# Patient Record
Sex: MALE | Race: WHITE | Employment: UNEMPLOYED | URBAN - METROPOLITAN AREA
[De-identification: names, ages, dates, MRNs, and addresses within clinical notes are randomized per-mention and may not be internally consistent; named-entity substitution may affect disease eponyms.]

---

## 2017-01-16 ENCOUNTER — OFFICE VISIT (OUTPATIENT)
Dept: SLEEP CENTER | Facility: HOSPITAL | Age: 42
End: 2017-01-16
Attending: INTERNAL MEDICINE
Payer: COMMERCIAL

## 2017-01-16 PROCEDURE — 95810 POLYSOM 6/> YRS 4/> PARAM: CPT

## 2017-01-19 NOTE — PROCEDURES
[address] [address]       Accredited by the [facility] of Sleep Medicine (AASM)    PATIENT'S NAME:        Britney Ng  ATTENDING PHYSICIAN:   Jackie Blackwood M.D. REFERRING PHYSICIAN:   KATE Cardenas There was no significant REM predominance. The oxygen warner was 83%. The patient spent 99% of the record with a saturation of greater than 90%. PERIODIC LIMB MOVEMENTS:  PLM index is 20 with a PLM arousal index of 7.     EEG:  With the limited recordin

## 2017-01-28 ENCOUNTER — HOSPITAL ENCOUNTER (OUTPATIENT)
Dept: ULTRASOUND IMAGING | Age: 42
Discharge: HOME | End: 2017-01-28
Attending: FAMILY MEDICINE
Payer: COMMERCIAL

## 2017-01-28 DIAGNOSIS — E01.0 THYROMEGALY: ICD-10-CM

## 2017-01-28 PROCEDURE — 76536 US EXAM OF HEAD AND NECK: CPT

## 2017-04-29 ENCOUNTER — LAB ENCOUNTER (OUTPATIENT)
Dept: LAB | Age: 42
End: 2017-04-29
Attending: FAMILY MEDICINE
Payer: COMMERCIAL

## 2017-04-29 DIAGNOSIS — E78.5 DYSLIPIDEMIA: ICD-10-CM

## 2017-04-29 DIAGNOSIS — Z00.00 LABORATORY EXAMINATION ORDERED AS PART OF A ROUTINE GENERAL MEDICAL EXAMINATION: ICD-10-CM

## 2017-04-29 PROCEDURE — 85025 COMPLETE CBC W/AUTO DIFF WBC: CPT

## 2017-04-29 PROCEDURE — 80053 COMPREHEN METABOLIC PANEL: CPT

## 2017-04-29 PROCEDURE — 86706 HEP B SURFACE ANTIBODY: CPT

## 2017-04-29 PROCEDURE — 80061 LIPID PANEL: CPT

## 2017-04-29 PROCEDURE — 82306 VITAMIN D 25 HYDROXY: CPT

## 2017-04-29 PROCEDURE — 84443 ASSAY THYROID STIM HORMONE: CPT

## 2017-04-29 PROCEDURE — 86708 HEPATITIS A ANTIBODY: CPT

## 2017-04-29 PROCEDURE — 36415 COLL VENOUS BLD VENIPUNCTURE: CPT

## 2017-05-01 ENCOUNTER — OFFICE VISIT (OUTPATIENT)
Dept: FAMILY MEDICINE CLINIC | Facility: CLINIC | Age: 42
End: 2017-05-01

## 2017-05-01 VITALS
HEIGHT: 70 IN | BODY MASS INDEX: 36.51 KG/M2 | RESPIRATION RATE: 14 BRPM | DIASTOLIC BLOOD PRESSURE: 80 MMHG | SYSTOLIC BLOOD PRESSURE: 120 MMHG | HEART RATE: 88 BPM | WEIGHT: 255 LBS

## 2017-05-01 DIAGNOSIS — E78.5 DYSLIPIDEMIA: ICD-10-CM

## 2017-05-01 DIAGNOSIS — Z13.89 SCREENING FOR GENITOURINARY CONDITION: ICD-10-CM

## 2017-05-01 DIAGNOSIS — Z23 NEED FOR VACCINATION: ICD-10-CM

## 2017-05-01 DIAGNOSIS — Z00.00 ROUTINE GENERAL MEDICAL EXAMINATION AT A HEALTH CARE FACILITY: Primary | ICD-10-CM

## 2017-05-01 PROCEDURE — 90632 HEPA VACCINE ADULT IM: CPT | Performed by: FAMILY MEDICINE

## 2017-05-01 PROCEDURE — 90746 HEPB VACCINE 3 DOSE ADULT IM: CPT | Performed by: FAMILY MEDICINE

## 2017-05-01 PROCEDURE — 99396 PREV VISIT EST AGE 40-64: CPT | Performed by: FAMILY MEDICINE

## 2017-05-01 PROCEDURE — 90715 TDAP VACCINE 7 YRS/> IM: CPT | Performed by: FAMILY MEDICINE

## 2017-05-01 PROCEDURE — 81003 URINALYSIS AUTO W/O SCOPE: CPT | Performed by: FAMILY MEDICINE

## 2017-05-01 PROCEDURE — 90471 IMMUNIZATION ADMIN: CPT | Performed by: FAMILY MEDICINE

## 2017-05-01 PROCEDURE — 90472 IMMUNIZATION ADMIN EACH ADD: CPT | Performed by: FAMILY MEDICINE

## 2017-05-01 RX ORDER — DOXEPIN HYDROCHLORIDE 50 MG/1
1 CAPSULE ORAL DAILY
COMMUNITY
End: 2018-03-09

## 2017-05-01 RX ORDER — CHLORAL HYDRATE 500 MG
2400 CAPSULE ORAL DAILY
COMMUNITY
End: 2018-03-09
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
## 2017-06-02 ENCOUNTER — OFFICE VISIT (OUTPATIENT)
Dept: FAMILY MEDICINE CLINIC | Facility: CLINIC | Age: 42
End: 2017-06-02

## 2017-06-02 VITALS
RESPIRATION RATE: 20 BRPM | SYSTOLIC BLOOD PRESSURE: 124 MMHG | DIASTOLIC BLOOD PRESSURE: 82 MMHG | BODY MASS INDEX: 35.91 KG/M2 | TEMPERATURE: 97 F | WEIGHT: 248 LBS | HEIGHT: 69.7 IN | HEART RATE: 64 BPM

## 2017-06-02 DIAGNOSIS — E78.00 HYPERCHOLESTEROLEMIA: ICD-10-CM

## 2017-06-02 DIAGNOSIS — E55.9 VITAMIN D DEFICIENCY: Primary | ICD-10-CM

## 2017-06-02 DIAGNOSIS — Z23 HEPATITIS B VACCINATION ADMINISTERED AT CURRENT VISIT: ICD-10-CM

## 2017-06-02 PROCEDURE — 90471 IMMUNIZATION ADMIN: CPT | Performed by: FAMILY MEDICINE

## 2017-06-02 PROCEDURE — 99213 OFFICE O/P EST LOW 20 MIN: CPT | Performed by: FAMILY MEDICINE

## 2017-06-02 PROCEDURE — 90746 HEPB VACCINE 3 DOSE ADULT IM: CPT | Performed by: FAMILY MEDICINE

## 2017-06-02 NOTE — PROGRESS NOTES
[facility] [place]  PROGRESS NOTE  Chief Complaint:   Patient presents with:  Establish Care      HPI:   This is a 42year old male presents to establish care at clinic. Patient had hepatitis B vaccine his first dose about a month ago.   He is h Hypertension Mother    • Cancer Paternal Grandmother    • Heart Disorder Paternal Grandfather      Allergies:  No Known Allergies  Current Meds:    Current Outpatient Prescriptions:  multivitamin Oral Tab Take 1 tablet by mouth daily.  Disp:  Rfl:    rex dorsalis pedis pulses bilaterally. ABDOMEN:  Soft, nondistended, nontender, bowel sounds normal in all 4 quadrants, no masses, no hepatosplenomegaly. SKIN: No rashes, no skin lesion, no bruising, good turgor.   LYMPHATIC: No cervical lymphadenopathy, no o

## 2017-06-02 NOTE — PATIENT INSTRUCTIONS
Hep B 2nd vaccine given today. Next in 5 months. Advice low cholesterol diet and exercise. Continue using fish oil supplement. Recheck cholesterol  in 5 months when returning for 3rd Hep B vaccine.

## 2017-11-03 ENCOUNTER — TELEPHONE (OUTPATIENT)
Dept: FAMILY MEDICINE CLINIC | Facility: CLINIC | Age: 42
End: 2017-11-03

## 2017-11-03 NOTE — TELEPHONE ENCOUNTER
I spoke to [facility] about him missing his lab and injection appointment today. ..he said that he thought he put it in his phone calendar and was so sorry he missed it and will call back to reschedule

## 2017-12-07 ENCOUNTER — OFFICE VISIT (OUTPATIENT)
Dept: FAMILY MEDICINE CLINIC | Facility: CLINIC | Age: 42
End: 2017-12-07

## 2017-12-07 VITALS
BODY MASS INDEX: 34.39 KG/M2 | HEIGHT: 70 IN | DIASTOLIC BLOOD PRESSURE: 88 MMHG | SYSTOLIC BLOOD PRESSURE: 130 MMHG | WEIGHT: 240.19 LBS | RESPIRATION RATE: 18 BRPM | HEART RATE: 92 BPM | TEMPERATURE: 98 F

## 2017-12-07 DIAGNOSIS — Z23 IMMUNIZATION DUE: ICD-10-CM

## 2017-12-07 DIAGNOSIS — M19.90 ARTHRITIS: Primary | ICD-10-CM

## 2017-12-07 PROCEDURE — 99214 OFFICE O/P EST MOD 30 MIN: CPT | Performed by: FAMILY MEDICINE

## 2017-12-07 PROCEDURE — 90471 IMMUNIZATION ADMIN: CPT | Performed by: FAMILY MEDICINE

## 2017-12-07 PROCEDURE — 90746 HEPB VACCINE 3 DOSE ADULT IM: CPT | Performed by: FAMILY MEDICINE

## 2017-12-07 PROCEDURE — 86038 ANTINUCLEAR ANTIBODIES: CPT | Performed by: FAMILY MEDICINE

## 2017-12-07 PROCEDURE — 84550 ASSAY OF BLOOD/URIC ACID: CPT | Performed by: FAMILY MEDICINE

## 2017-12-07 PROCEDURE — 90632 HEPA VACCINE ADULT IM: CPT | Performed by: FAMILY MEDICINE

## 2017-12-07 PROCEDURE — 85652 RBC SED RATE AUTOMATED: CPT | Performed by: FAMILY MEDICINE

## 2017-12-07 PROCEDURE — 86140 C-REACTIVE PROTEIN: CPT | Performed by: FAMILY MEDICINE

## 2017-12-07 PROCEDURE — 90472 IMMUNIZATION ADMIN EACH ADD: CPT | Performed by: FAMILY MEDICINE

## 2017-12-07 PROCEDURE — 86200 CCP ANTIBODY: CPT | Performed by: FAMILY MEDICINE

## 2017-12-07 PROCEDURE — 86431 RHEUMATOID FACTOR QUANT: CPT | Performed by: FAMILY MEDICINE

## 2017-12-07 NOTE — PROGRESS NOTES
[facility] [place]  PROGRESS NOTE  Chief Complaint:   Patient presents with:  Imm/Inj: needs injection, missed appt last month  Arthritis: wants to discuss arthritis in hands      HPI:   This is a 36year old male presents for evaluation of arth 1.8 oz/week     Cans of beer: 3 per week     Comment: occasionally/ socially     Social History Narrative    ** Merged History Encounter **           Family History:  Family History   Problem Relation Age of Onset   • Cancer Father      colon   • Diabetes Sitting, Cuff Size: large)   Pulse 92   Temp 98.3 °F (36.8 °C) (Temporal)   Resp 18   Ht 70\"   Wt 240 lb 3.2 oz   BMI 34.47 kg/m²  Estimated body mass index is 34.47 kg/m² as calculated from the following:    Height as of this encounter: 70\".     Weight a drinking Red Bull. Schedule appointment for ADD evaluation. Health Maintenance:   Tobacco Cessation Counseling 2 Years due on 09/26/1986  Pneumococcal PPSV23 Medium Risk Adult(1 of 1 - PPSV23) due on 09/26/1993  Influenza Vaccine(1) due on 09/01/201

## 2017-12-07 NOTE — PATIENT INSTRUCTIONS
Check arthritis labs today. Immunizations given. Schedule appointment with Dr Елена Hou. Recommend to stop drinking Red Bull. Schedule appointment for ADD evaluation.

## 2017-12-11 ENCOUNTER — TELEPHONE (OUTPATIENT)
Dept: FAMILY MEDICINE CLINIC | Facility: CLINIC | Age: 42
End: 2017-12-11

## 2017-12-11 NOTE — TELEPHONE ENCOUNTER
----- Message from Gonzalo Newton MD sent at 12/11/2017 11:08 AM CST -----  Please inform patient that his arthritis panel is negative and in normal range. Recommend to also fax result to Dr Jillian Francois. Recommend patient to follow-up with him.

## 2018-01-04 ENCOUNTER — OFFICE VISIT (OUTPATIENT)
Dept: FAMILY MEDICINE CLINIC | Facility: CLINIC | Age: 43
End: 2018-01-04

## 2018-01-04 VITALS
SYSTOLIC BLOOD PRESSURE: 112 MMHG | WEIGHT: 237.38 LBS | OXYGEN SATURATION: 98 % | HEIGHT: 69.5 IN | HEART RATE: 86 BPM | DIASTOLIC BLOOD PRESSURE: 60 MMHG | TEMPERATURE: 99 F | BODY MASS INDEX: 34.37 KG/M2 | RESPIRATION RATE: 18 BRPM

## 2018-01-04 DIAGNOSIS — H66.001 ACUTE SUPPURATIVE OTITIS MEDIA OF RIGHT EAR WITHOUT SPONTANEOUS RUPTURE OF TYMPANIC MEMBRANE, RECURRENCE NOT SPECIFIED: ICD-10-CM

## 2018-01-04 DIAGNOSIS — J01.00 ACUTE MAXILLARY SINUSITIS, RECURRENCE NOT SPECIFIED: Primary | ICD-10-CM

## 2018-01-04 PROCEDURE — 99214 OFFICE O/P EST MOD 30 MIN: CPT | Performed by: FAMILY MEDICINE

## 2018-01-04 RX ORDER — AMOXICILLIN 500 MG/1
500 CAPSULE ORAL 3 TIMES DAILY
Qty: 30 CAPSULE | Refills: 0 | Status: SHIPPED | OUTPATIENT
Start: 2018-01-04 | End: 2018-01-14

## 2018-01-04 RX ORDER — FLUTICASONE PROPIONATE 50 MCG
1 SPRAY, SUSPENSION (ML) NASAL DAILY
Qty: 1 BOTTLE | Refills: 0 | Status: SHIPPED | OUTPATIENT
Start: 2018-01-04 | End: 2018-09-14

## 2018-01-04 NOTE — PROGRESS NOTES
[address] NOTE  Chief Complaint:   Patient presents with:  Fever: low grade fever for 5 days  Ear Pain: ear pain in right ear, clogged, nasal congestion for 2 weeks.        HPI:   This is a 36year old male presents presents co History Encounter **           Family History:  Family History   Problem Relation Age of Onset   • Cancer Father      colon   • Diabetes Father    • Heart Disorder Father      CABG, stents   • Hypertension Father    • Heart Disorder Mother      a fib   • H Estimated body mass index is 34.56 kg/m² as calculated from the following:    Height as of this encounter: 69.5\". Weight as of this encounter: 237 lb 6.4 oz. Vital signs reviewed.     GEN:  Patient is alert, awake and oriented, well developed, well no topical vicks, nasal saline spray as needed. Start amoxicillin and flonase. May continue with mucinex. May use ibuprofen as needed   Return to clinic in 1-2 weeks if no improvement. Sooner if symptoms gets worse. Health Maintenance:   Tobacco

## 2018-01-04 NOTE — PATIENT INSTRUCTIONS
Adequate rest and hydration, warm facial packs, and steam inhalation. May use topical vicks, nasal saline spray as needed. Start amoxicillin and flonase. May continue with mucinex.    May use ibuprofen as needed   Return to clinic in 1-2 weeks if no i

## 2018-02-21 ENCOUNTER — TELEPHONE (OUTPATIENT)
Dept: FAMILY MEDICINE CLINIC | Facility: CLINIC | Age: 43
End: 2018-02-21

## 2018-02-21 DIAGNOSIS — M19.90 ARTHRITIS: Primary | ICD-10-CM

## 2018-02-21 NOTE — TELEPHONE ENCOUNTER
Wife states that he was referred to Dr Teto Lujan in December but our office stated that it was in his insurance network.  Wife states that she spoke with insurance and they said that the patient could go to Dr Teto Lujan as long as he had a referral from the D

## 2018-02-27 ENCOUNTER — TELEPHONE (OUTPATIENT)
Dept: FAMILY MEDICINE CLINIC | Facility: CLINIC | Age: 43
End: 2018-02-27

## 2018-02-27 DIAGNOSIS — M19.90 ARTHRITIS: Primary | ICD-10-CM

## 2018-02-27 NOTE — TELEPHONE ENCOUNTER
needs new referral and OV notes etc  faxed to [address] [place] [address]    @ [facility]    fax# [phone]      OTC til Wed 2/28

## 2018-02-28 NOTE — TELEPHONE ENCOUNTER
Can you please place orders for Dr Giovanni Cohen.  is part of the [employer] system. Can you please advise. Thank you.

## 2018-03-09 ENCOUNTER — OFFICE VISIT (OUTPATIENT)
Dept: RHEUMATOLOGY | Facility: CLINIC | Age: 43
End: 2018-03-09

## 2018-03-09 VITALS
BODY MASS INDEX: 34 KG/M2 | HEART RATE: 72 BPM | RESPIRATION RATE: 16 BRPM | SYSTOLIC BLOOD PRESSURE: 118 MMHG | WEIGHT: 235 LBS | DIASTOLIC BLOOD PRESSURE: 82 MMHG

## 2018-03-09 DIAGNOSIS — M79.642 PAIN IN BOTH HANDS: Primary | ICD-10-CM

## 2018-03-09 DIAGNOSIS — M79.7 FIBROMYALGIA: ICD-10-CM

## 2018-03-09 DIAGNOSIS — S09.90XS TRAUMATIC INJURY OF HEAD, SEQUELA: ICD-10-CM

## 2018-03-09 DIAGNOSIS — M79.641 PAIN IN BOTH HANDS: Primary | ICD-10-CM

## 2018-03-09 DIAGNOSIS — M15.1 HEBERDEN NODE: ICD-10-CM

## 2018-03-09 PROBLEM — S09.90XA HEAD INJURY DUE TO TRAUMA: Status: ACTIVE | Noted: 2018-03-09

## 2018-03-09 PROBLEM — M19.90 ARTHRITIS: Status: RESOLVED | Noted: 2018-02-21 | Resolved: 2018-03-09

## 2018-03-09 PROCEDURE — 99244 OFF/OP CNSLTJ NEW/EST MOD 40: CPT | Performed by: INTERNAL MEDICINE

## 2018-03-09 RX ORDER — DULOXETIN HYDROCHLORIDE 60 MG/1
60 CAPSULE, DELAYED RELEASE ORAL DAILY
Qty: 30 CAPSULE | Refills: 3 | Status: SHIPPED | OUTPATIENT
Start: 2018-03-09 | End: 2018-07-03

## 2018-03-09 RX ORDER — ETODOLAC 400 MG/1
400 TABLET, FILM COATED ORAL 2 TIMES DAILY
Qty: 60 TABLET | Refills: 3 | Status: SHIPPED | OUTPATIENT
Start: 2018-03-09 | End: 2018-04-17
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
## 2018-04-12 ENCOUNTER — HOSPITAL ENCOUNTER (OUTPATIENT)
Dept: GENERAL RADIOLOGY | Age: 43
Discharge: HOME | End: 2018-04-12
Attending: INTERNAL MEDICINE
Payer: COMMERCIAL

## 2018-04-12 DIAGNOSIS — M79.642 PAIN IN BOTH HANDS: ICD-10-CM

## 2018-04-12 DIAGNOSIS — M79.641 PAIN IN BOTH HANDS: ICD-10-CM

## 2018-04-12 PROCEDURE — 73130 X-RAY EXAM OF HAND: CPT | Performed by: INTERNAL MEDICINE

## 2018-04-17 ENCOUNTER — OFFICE VISIT (OUTPATIENT)
Dept: RHEUMATOLOGY | Facility: CLINIC | Age: 43
End: 2018-04-17

## 2018-04-17 VITALS
WEIGHT: 232 LBS | RESPIRATION RATE: 20 BRPM | SYSTOLIC BLOOD PRESSURE: 110 MMHG | HEART RATE: 76 BPM | HEIGHT: 69 IN | BODY MASS INDEX: 34.36 KG/M2 | DIASTOLIC BLOOD PRESSURE: 70 MMHG

## 2018-04-17 DIAGNOSIS — S09.90XS TRAUMATIC INJURY OF HEAD, SEQUELA: ICD-10-CM

## 2018-04-17 DIAGNOSIS — M79.641 PAIN IN BOTH HANDS: ICD-10-CM

## 2018-04-17 DIAGNOSIS — M79.642 PAIN IN BOTH HANDS: ICD-10-CM

## 2018-04-17 DIAGNOSIS — M79.7 FIBROMYALGIA: Primary | ICD-10-CM

## 2018-04-17 DIAGNOSIS — M18.0 OSTEOARTHRITIS OF CARPOMETACARPAL (CMC) JOINT OF BOTH THUMBS: ICD-10-CM

## 2018-04-17 DIAGNOSIS — M15.1 HEBERDEN NODE: ICD-10-CM

## 2018-04-17 PROCEDURE — 99214 OFFICE O/P EST MOD 30 MIN: CPT | Performed by: INTERNAL MEDICINE

## 2018-04-17 RX ORDER — RUFINAMIDE 40 MG/ML
1 SUSPENSION ORAL DAILY
COMMUNITY
End: 2020-09-03

## 2018-04-17 NOTE — PROGRESS NOTES
[facility]  Dr. González Gan Progress Note     Subjective:   Jaqui Gonzalez is a(n) 36year old male. Current complaints: Patient presents with:  Fibromyalgia Syndrome: 6 week f/u.  Pt on Cymbalta x 6 weeks pt stated works well for [employer] and therapy. Exercise as best you can, consider yoga, edgar chi, strectching. Well balanced diet. Return to office 3 months.       Cele Mccray MD 5/70/4442 11:19 AM

## 2018-04-17 NOTE — PATIENT INSTRUCTIONS
Continue Cymbalta 60 mg daily for Fibromyalgia. Talk to primary about referral to hand specialist.  For pain consider use of medical marijuana as an option instead of Vicodin/Norco.  Afraid NSAIDs due to gi intolerance. Consider water exercise therapy.

## 2018-07-03 RX ORDER — DULOXETIN HYDROCHLORIDE 60 MG/1
CAPSULE, DELAYED RELEASE ORAL
Qty: 30 CAPSULE | Refills: 0 | Status: SHIPPED | OUTPATIENT
Start: 2018-07-03 | End: 2018-07-23

## 2018-07-03 NOTE — TELEPHONE ENCOUNTER
Future Appointments  Date Time Provider Gregor Burgos   9/79/2466 8:30 PM Kimberlee Lucas MD [facility] Charissa Hoover

## 2018-07-23 ENCOUNTER — OFFICE VISIT (OUTPATIENT)
Dept: RHEUMATOLOGY | Facility: CLINIC | Age: 43
End: 2018-07-23

## 2018-07-23 VITALS
RESPIRATION RATE: 12 BRPM | HEART RATE: 72 BPM | SYSTOLIC BLOOD PRESSURE: 116 MMHG | BODY MASS INDEX: 34 KG/M2 | DIASTOLIC BLOOD PRESSURE: 78 MMHG | WEIGHT: 231 LBS

## 2018-07-23 DIAGNOSIS — M18.0 OSTEOARTHRITIS OF CARPOMETACARPAL (CMC) JOINT OF BOTH THUMBS: ICD-10-CM

## 2018-07-23 DIAGNOSIS — M79.642 PAIN IN BOTH HANDS: ICD-10-CM

## 2018-07-23 DIAGNOSIS — M79.641 PAIN IN BOTH HANDS: ICD-10-CM

## 2018-07-23 DIAGNOSIS — M79.7 FIBROMYALGIA: Primary | ICD-10-CM

## 2018-07-23 PROCEDURE — 99213 OFFICE O/P EST LOW 20 MIN: CPT | Performed by: INTERNAL MEDICINE

## 2018-07-23 RX ORDER — DULOXETIN HYDROCHLORIDE 60 MG/1
60 CAPSULE, DELAYED RELEASE ORAL 2 TIMES DAILY
Qty: 60 CAPSULE | Refills: 3 | Status: SHIPPED | OUTPATIENT
Start: 2018-07-23 | End: 2018-07-23

## 2018-07-23 RX ORDER — DULOXETIN HYDROCHLORIDE 60 MG/1
60 CAPSULE, DELAYED RELEASE ORAL 2 TIMES DAILY
Qty: 180 CAPSULE | Refills: 1 | Status: SHIPPED | OUTPATIENT
Start: 2018-07-23 | End: 2019-01-31

## 2018-07-23 NOTE — PROGRESS NOTES
[facility]  Dr. Maxine Garcia Progress Note     Subjective:   Breana Baeza is a(n) 36year old male. Current complaints: Patient presents with:  Fibromyalgia Syndrome: 3 month f/u.  Pt has increased cymbalta 60 mg to twice a day about 5 days ago- f

## 2018-07-23 NOTE — PATIENT INSTRUCTIONS
Use cymbalta 60 mg twice a day for fibromyalgia. No NSAIDs due to stomach upset. Ok to apply for medical marijuana for pain control. For hand pain see Dr Sai Silva or Dr Christiano Smallwood. [address] [facility] hand specialists.   Alternate Hand specialist

## 2018-07-31 RX ORDER — DULOXETIN HYDROCHLORIDE 60 MG/1
CAPSULE, DELAYED RELEASE ORAL
Qty: 30 CAPSULE | Refills: 0 | OUTPATIENT
Start: 2018-07-31

## 2018-08-31 ENCOUNTER — TELEPHONE (OUTPATIENT)
Dept: FAMILY MEDICINE CLINIC | Facility: CLINIC | Age: 43
End: 2018-08-31

## 2018-09-06 NOTE — TELEPHONE ENCOUNTER
Set up appt. Future Appointments  Date Time Provider Gregor Burgos   9/13/2018 4:20 PM Tex Garay MD [facility] [place] [facility]   62/67/1052 4:29 PM Tani Wilson MD [facility]     No other questions at this time.

## 2018-09-14 ENCOUNTER — OFFICE VISIT (OUTPATIENT)
Dept: FAMILY MEDICINE CLINIC | Facility: CLINIC | Age: 43
End: 2018-09-14

## 2018-09-14 VITALS
OXYGEN SATURATION: 99 % | HEART RATE: 77 BPM | BODY MASS INDEX: 34.33 KG/M2 | DIASTOLIC BLOOD PRESSURE: 78 MMHG | RESPIRATION RATE: 20 BRPM | HEIGHT: 69 IN | SYSTOLIC BLOOD PRESSURE: 110 MMHG | WEIGHT: 231.81 LBS | TEMPERATURE: 98 F

## 2018-09-14 DIAGNOSIS — G47.33 OSA (OBSTRUCTIVE SLEEP APNEA): ICD-10-CM

## 2018-09-14 DIAGNOSIS — M79.641 PAIN IN BOTH HANDS: Primary | ICD-10-CM

## 2018-09-14 DIAGNOSIS — M79.642 PAIN IN BOTH HANDS: Primary | ICD-10-CM

## 2018-09-14 PROCEDURE — 99213 OFFICE O/P EST LOW 20 MIN: CPT | Performed by: FAMILY MEDICINE

## 2018-09-14 NOTE — PATIENT INSTRUCTIONS
Recommend aleve or ibuprofen as needed   See orthopedics. See Dr Pankaj Paredes for sleep apnea. Return to clinic if any concern.

## 2018-09-14 NOTE — PROGRESS NOTES
[facility] [place]  PROGRESS NOTE  Chief Complaint:   Patient presents with:  Referral: would like see a hand specialist and sleep apnea specialist       HPI:   This is a 36year old male presents for evaluation of both hand pain and sleep apnea Rfl: 1   Multivitamin Chewtab, ADULT, Oral Chew Tab Chew 1 tablet by mouth daily. Disp:  Rfl:    Aspirin-Acetaminophen-Caffeine (EXCEDRIN OR) Take by mouth as needed.  Disp:  Rfl:       Ready to quit: Not Answered  Counseling given: Not Answered         REV normal in all 4 quadrants, no Masses, no hepatosplenomegaly. SKIN: No rashes, no skin lesion, no bruising, good turgor. LYMPHATIC: No cervical lymphadenopathy, no other lymphadenopathy.   MUSCULOSKELETAL: Mild tenderness with palpation over distal digits

## 2018-10-09 PROBLEM — M18.0 ARTHRITIS OF CARPOMETACARPAL (CMC) JOINT OF BOTH THUMBS: Status: ACTIVE | Noted: 2018-10-09

## 2018-10-09 PROBLEM — M19.041 ARTHRITIS OF FINGER OF BOTH HANDS: Status: ACTIVE | Noted: 2018-10-09

## 2018-10-09 PROBLEM — M19.042 ARTHRITIS OF FINGER OF BOTH HANDS: Status: ACTIVE | Noted: 2018-10-09

## 2019-01-31 ENCOUNTER — OFFICE VISIT (OUTPATIENT)
Dept: RHEUMATOLOGY | Facility: CLINIC | Age: 44
End: 2019-01-31

## 2019-01-31 VITALS
SYSTOLIC BLOOD PRESSURE: 124 MMHG | DIASTOLIC BLOOD PRESSURE: 62 MMHG | BODY MASS INDEX: 34.07 KG/M2 | HEIGHT: 69 IN | RESPIRATION RATE: 16 BRPM | WEIGHT: 230 LBS | HEART RATE: 78 BPM

## 2019-01-31 DIAGNOSIS — M18.0 OSTEOARTHRITIS OF CARPOMETACARPAL (CMC) JOINT OF BOTH THUMBS: ICD-10-CM

## 2019-01-31 DIAGNOSIS — M79.7 FIBROMYALGIA: Primary | ICD-10-CM

## 2019-01-31 PROCEDURE — 99213 OFFICE O/P EST LOW 20 MIN: CPT | Performed by: INTERNAL MEDICINE

## 2019-01-31 RX ORDER — DULOXETIN HYDROCHLORIDE 60 MG/1
60 CAPSULE, DELAYED RELEASE ORAL 2 TIMES DAILY
Qty: 180 CAPSULE | Refills: 1 | Status: SHIPPED | OUTPATIENT
Start: 2019-01-31 | End: 2019-07-30

## 2019-01-31 NOTE — PROGRESS NOTES
[facility]  Dr. Gayatri Urias Progress Note     Subjective:   Yue Tovar is a(n) 39year old male. Current complaints: Patient presents with:  Fibromyalgia Syndrome: LOV 7/23/18. Pt continues with Cymbalta, and started medical marijuiana.   Osteo

## 2019-01-31 NOTE — PATIENT INSTRUCTIONS
Use cymbalta 60 mg twice a day. Use medical marijuana as needed. Exercise regularly. Return to office 6 months.

## 2019-02-22 RX ORDER — DULOXETIN HYDROCHLORIDE 60 MG/1
CAPSULE, DELAYED RELEASE ORAL
Qty: 180 CAPSULE | Refills: 0 | OUTPATIENT
Start: 2019-02-22

## 2019-07-30 RX ORDER — DULOXETIN HYDROCHLORIDE 60 MG/1
CAPSULE, DELAYED RELEASE ORAL
Qty: 180 CAPSULE | Refills: 0 | OUTPATIENT
Start: 2019-07-30

## 2019-07-30 RX ORDER — DULOXETIN HYDROCHLORIDE 60 MG/1
CAPSULE, DELAYED RELEASE ORAL
Qty: 60 CAPSULE | Refills: 0 | Status: SHIPPED | OUTPATIENT
Start: 2019-07-30 | End: 2019-08-05 | Stop reason: DRUGHIGH

## 2019-08-01 ENCOUNTER — TELEPHONE (OUTPATIENT)
Dept: FAMILY MEDICINE CLINIC | Facility: CLINIC | Age: 44
End: 2019-08-01

## 2019-08-01 DIAGNOSIS — M19.90 ARTHRITIS: Primary | ICD-10-CM

## 2019-08-01 NOTE — TELEPHONE ENCOUNTER
Patient's wife states that they have been seeing Dr Nidhi Rincon for about a year now but patient has an appt coming up and the office stated they needed an updated referral in their system.      Dr Claudia Lee can you please advise on referral for Dr Nidhi Rincon in [place]

## 2019-08-01 NOTE — TELEPHONE ENCOUNTER
Let pt's wife know the following below. Pt's wife verbalized her understanding and had no other questions at this time. Wife will talk with the patient and call the office back to set up appt.

## 2019-08-05 ENCOUNTER — OFFICE VISIT (OUTPATIENT)
Dept: RHEUMATOLOGY | Facility: CLINIC | Age: 44
End: 2019-08-05

## 2019-08-05 VITALS
SYSTOLIC BLOOD PRESSURE: 120 MMHG | HEIGHT: 69 IN | WEIGHT: 230 LBS | DIASTOLIC BLOOD PRESSURE: 80 MMHG | BODY MASS INDEX: 34.07 KG/M2 | HEART RATE: 88 BPM

## 2019-08-05 DIAGNOSIS — M19.042 ARTHRITIS OF FINGER OF BOTH HANDS: Primary | ICD-10-CM

## 2019-08-05 DIAGNOSIS — M19.041 ARTHRITIS OF FINGER OF BOTH HANDS: Primary | ICD-10-CM

## 2019-08-05 DIAGNOSIS — M79.7 FIBROMYALGIA: ICD-10-CM

## 2019-08-05 DIAGNOSIS — M18.0 OSTEOARTHRITIS OF CARPOMETACARPAL (CMC) JOINT OF BOTH THUMBS: ICD-10-CM

## 2019-08-05 DIAGNOSIS — G56.02 CARPAL TUNNEL SYNDROME OF LEFT WRIST: ICD-10-CM

## 2019-08-05 PROBLEM — F32.A DEPRESSION: Status: ACTIVE | Noted: 2019-08-05

## 2019-08-05 PROCEDURE — 99213 OFFICE O/P EST LOW 20 MIN: CPT | Performed by: INTERNAL MEDICINE

## 2019-08-05 RX ORDER — FLUOXETINE HYDROCHLORIDE 20 MG/1
20 CAPSULE ORAL DAILY
Qty: 90 CAPSULE | Refills: 3 | Status: SHIPPED | OUTPATIENT
Start: 2019-08-05 | End: 2020-02-21

## 2019-08-05 RX ORDER — DULOXETIN HYDROCHLORIDE 30 MG/1
30 CAPSULE, DELAYED RELEASE ORAL DAILY
Qty: 90 CAPSULE | Refills: 1 | Status: SHIPPED | OUTPATIENT
Start: 2019-08-05 | End: 2019-09-24

## 2019-08-05 NOTE — PATIENT INSTRUCTIONS
Current plan - introduce prozac/fluoxetine 20 mg per day in the morning with duloxetine 60 mg in the am.  Cut back on the evening cymbalta/duloxetine to 30 mg per day with the introduction of prozac. After 2 weeks drop off the cymbalta evening dose.   See

## 2019-08-05 NOTE — PROGRESS NOTES
[facility]  Dr. Ti Ashby Progress Note     Subjective:   eTe Virgen is a(n) 39year old male.    Current complaints: Patient presents with:  Fibromyalgia Syndrome: f/u, while on vacation ran out of medical marijuana and then has problems toelr

## 2019-09-24 ENCOUNTER — OFFICE VISIT (OUTPATIENT)
Dept: RHEUMATOLOGY | Facility: CLINIC | Age: 44
End: 2019-09-24

## 2019-09-24 VITALS
SYSTOLIC BLOOD PRESSURE: 120 MMHG | BODY MASS INDEX: 34.07 KG/M2 | RESPIRATION RATE: 18 BRPM | HEART RATE: 80 BPM | HEIGHT: 69 IN | WEIGHT: 230 LBS | DIASTOLIC BLOOD PRESSURE: 80 MMHG

## 2019-09-24 DIAGNOSIS — M18.0 OSTEOARTHRITIS OF CARPOMETACARPAL (CMC) JOINT OF BOTH THUMBS: ICD-10-CM

## 2019-09-24 DIAGNOSIS — M79.7 FIBROMYALGIA: Primary | ICD-10-CM

## 2019-09-24 PROCEDURE — 99213 OFFICE O/P EST LOW 20 MIN: CPT | Performed by: INTERNAL MEDICINE

## 2019-09-24 RX ORDER — DULOXETIN HYDROCHLORIDE 60 MG/1
60 CAPSULE, DELAYED RELEASE ORAL 2 TIMES DAILY
COMMUNITY
End: 2019-09-24

## 2019-09-24 RX ORDER — DULOXETIN HYDROCHLORIDE 60 MG/1
60 CAPSULE, DELAYED RELEASE ORAL 2 TIMES DAILY
Qty: 180 CAPSULE | Refills: 1 | Status: SHIPPED | OUTPATIENT
Start: 2019-09-24 | End: 2020-03-27

## 2019-09-24 NOTE — PROGRESS NOTES
[facility]  Dr. Eusebia Stiles Progress Note     Subjective:   Bessy Menjivar is a(n) 39year old male. Current complaints: Patient presents with:  Fibromyalgia Syndrome: Now on Cymbalta 120 mg bid and Prozac 20 mg in evening, doing ok.   Pain is doin

## 2019-09-24 NOTE — PATIENT INSTRUCTIONS
For fibromyalgia use duloxetine 60 mg twice a day. Use prozac 20 mg at night. Use medical marijuana as needed. Exercise as tolerated. Return to office 6 months.

## 2020-01-03 ENCOUNTER — TELEPHONE (OUTPATIENT)
Dept: RHEUMATOLOGY | Facility: CLINIC | Age: 45
End: 2020-01-03

## 2020-02-21 ENCOUNTER — TELEPHONE (OUTPATIENT)
Dept: RHEUMATOLOGY | Facility: CLINIC | Age: 45
End: 2020-02-21

## 2020-02-21 RX ORDER — FLUOXETINE HYDROCHLORIDE 20 MG/1
20 CAPSULE ORAL DAILY
Qty: 90 CAPSULE | Refills: 0 | Status: SHIPPED | OUTPATIENT
Start: 2020-02-21 | End: 2020-03-16

## 2020-03-16 ENCOUNTER — TELEPHONE (OUTPATIENT)
Dept: RHEUMATOLOGY | Facility: CLINIC | Age: 45
End: 2020-03-16

## 2020-03-16 RX ORDER — FLUOXETINE HYDROCHLORIDE 20 MG/1
20 CAPSULE ORAL 2 TIMES DAILY
Qty: 180 CAPSULE | Refills: 1 | Status: SHIPPED | OUTPATIENT
Start: 2020-03-16 | End: 2020-09-11

## 2020-03-16 NOTE — TELEPHONE ENCOUNTER
Patient called. Discussed the coronavirus situation. Follow CDC guidelines. Limited activity. Avoid crowds. Wash hands frequently. Refill for Prozac 20 mg twice a day given #180. See in office in 3 months. Dr. Ronda Caceres.

## 2020-03-27 RX ORDER — DULOXETIN HYDROCHLORIDE 60 MG/1
CAPSULE, DELAYED RELEASE ORAL
Qty: 180 CAPSULE | Refills: 1 | Status: SHIPPED | OUTPATIENT
Start: 2020-03-27 | End: 2020-06-25

## 2020-06-26 RX ORDER — DULOXETIN HYDROCHLORIDE 60 MG/1
CAPSULE, DELAYED RELEASE ORAL
Qty: 180 CAPSULE | Refills: 0 | Status: SHIPPED | OUTPATIENT
Start: 2020-06-26 | End: 2020-08-12

## 2020-07-16 ENCOUNTER — TELEPHONE (OUTPATIENT)
Dept: FAMILY MEDICINE CLINIC | Facility: CLINIC | Age: 45
End: 2020-07-16

## 2020-08-12 ENCOUNTER — OFFICE VISIT (OUTPATIENT)
Dept: FAMILY MEDICINE CLINIC | Facility: CLINIC | Age: 45
End: 2020-08-12

## 2020-08-12 VITALS
WEIGHT: 205 LBS | OXYGEN SATURATION: 97 % | HEIGHT: 69 IN | BODY MASS INDEX: 30.36 KG/M2 | SYSTOLIC BLOOD PRESSURE: 122 MMHG | RESPIRATION RATE: 18 BRPM | TEMPERATURE: 98 F | HEART RATE: 88 BPM | DIASTOLIC BLOOD PRESSURE: 60 MMHG

## 2020-08-12 DIAGNOSIS — G47.39 OTHER SLEEP APNEA: ICD-10-CM

## 2020-08-12 DIAGNOSIS — G89.29 CHRONIC PAIN OF RIGHT KNEE: Primary | ICD-10-CM

## 2020-08-12 DIAGNOSIS — F43.10 PTSD (POST-TRAUMATIC STRESS DISORDER): ICD-10-CM

## 2020-08-12 DIAGNOSIS — M25.561 CHRONIC PAIN OF RIGHT KNEE: Primary | ICD-10-CM

## 2020-08-12 PROCEDURE — 99213 OFFICE O/P EST LOW 20 MIN: CPT | Performed by: FAMILY MEDICINE

## 2020-08-12 PROCEDURE — 3078F DIAST BP <80 MM HG: CPT | Performed by: FAMILY MEDICINE

## 2020-08-12 PROCEDURE — 3074F SYST BP LT 130 MM HG: CPT | Performed by: FAMILY MEDICINE

## 2020-08-12 PROCEDURE — 3008F BODY MASS INDEX DOCD: CPT | Performed by: FAMILY MEDICINE

## 2020-08-12 RX ORDER — DULOXETIN HYDROCHLORIDE 60 MG/1
60 CAPSULE, DELAYED RELEASE ORAL EVERY MORNING
COMMUNITY
Start: 2020-09-15 | End: 2020-10-01

## 2020-08-12 NOTE — PATIENT INSTRUCTIONS
Continue current medications  See orthopedics and psychiatrist.   Rest, ice pack, tylenol as needed. Return to clinic if any concern.

## 2020-08-12 NOTE — PROGRESS NOTES
[address]  PROGRESS NOTE  Chief Complaint:   Patient presents with:  Referral: for ortho and psych.       HPI:   This is a 38year old male with chronic knee pain and posttraumatic stress disorder presents for referral.  Patient has  Problem Relation Age of Onset   • Cancer Father         colon   • Diabetes Father    • Heart Disorder Father         CABG, stents   • Hypertension Father    • Heart Disorder Mother         a fib   • Hypertension Mother    • Cancer Paternal Grandmother auscultation bilterally, no rales/rhonchi/wheezing. HEART:  Regular rate and rhythm, S1 and S2 are normal, no murmurs, rubs or gallops. EXTREMITIES: No edema, no cyanosis, no clubbing, FROM, 2+ dorsalis pedis pulses bilaterally.   ABDOMEN: Soft, nondisten Pain in both hands     [place] node     Osteoarthritis of carpometacarpal (CMC) joint of both thumbs     Arthritis of finger of both hands     Carpal tunnel syndrome of left wrist     Depression      Gonzalo Newton MD    This note was created utilizing

## 2020-08-18 ENCOUNTER — OFFICE VISIT (OUTPATIENT)
Dept: FAMILY MEDICINE CLINIC | Facility: CLINIC | Age: 45
End: 2020-08-18

## 2020-08-18 VITALS
OXYGEN SATURATION: 98 % | SYSTOLIC BLOOD PRESSURE: 116 MMHG | BODY MASS INDEX: 30.51 KG/M2 | HEIGHT: 69 IN | RESPIRATION RATE: 18 BRPM | DIASTOLIC BLOOD PRESSURE: 78 MMHG | WEIGHT: 206 LBS | TEMPERATURE: 98 F | HEART RATE: 78 BPM

## 2020-08-18 DIAGNOSIS — G47.33 OSA (OBSTRUCTIVE SLEEP APNEA): Primary | ICD-10-CM

## 2020-08-18 PROCEDURE — 3078F DIAST BP <80 MM HG: CPT | Performed by: NURSE PRACTITIONER

## 2020-08-18 PROCEDURE — 99214 OFFICE O/P EST MOD 30 MIN: CPT | Performed by: NURSE PRACTITIONER

## 2020-08-18 PROCEDURE — 3074F SYST BP LT 130 MM HG: CPT | Performed by: NURSE PRACTITIONER

## 2020-08-18 PROCEDURE — 3008F BODY MASS INDEX DOCD: CPT | Performed by: NURSE PRACTITIONER

## 2020-08-18 NOTE — PROGRESS NOTES
[facility] [place] [place]  [place]LE[place]        HPI:   This is a 38year old male coming in for Patient presents with: Follow - Up: sleep      HPI:     Present for a sleep consult. Had a sleep study in January 2017.  Lost 65 pounds but gained kira Yes      Alcohol/week: 3.0 standard drinks      Types: 3 Cans of beer per week      Frequency: 2-4 times a month      Drinks per session: 1 or 2      Binge frequency: Never      Comment: occasionally/ socially     Drug use: Yes      Types:  Other-see commen Negative. Neurological: Positive for headaches. Psychiatric/Behavioral: Positive for sleep disturbance.        EXAM:   /78   Pulse 78   Temp 98.2 °F (36.8 °C) (Other)   Resp 18   Ht 69\"   Wt 206 lb (93.4 kg)   SpO2 98%   BMI 30.42 kg/m²  Estimat sent to [facility] - [phone] about the order next week. Start on auto-titrating CPAP. Recheck in 2 weeks after getting machine with Dr. Julianne Inman or Megan Hutton.      Advised if still with sleep apnea and not using CPAP has a  7 fold increase in risk of hear treatments as a result of today.        MARIE Fernández  8/18/2020  8:55 AM

## 2020-08-18 NOTE — PATIENT INSTRUCTIONS
Order for machine sent to [facility] - [phone] about the order next week. Start on auto-titrating CPAP. Recheck in 2 weeks after getting machine with Dr. Kenisha Davila or Yovanny Toro.      Advised if still with sleep apnea and not using CPAP has a  7 fold increa

## 2020-08-24 ENCOUNTER — TELEPHONE (OUTPATIENT)
Dept: FAMILY MEDICINE CLINIC | Facility: CLINIC | Age: 45
End: 2020-08-24

## 2020-08-24 DIAGNOSIS — F43.10 PTSD (POST-TRAUMATIC STRESS DISORDER): ICD-10-CM

## 2020-08-24 DIAGNOSIS — G47.09 OTHER INSOMNIA: ICD-10-CM

## 2020-08-24 DIAGNOSIS — F32.A DEPRESSION, UNSPECIFIED DEPRESSION TYPE: ICD-10-CM

## 2020-08-24 DIAGNOSIS — G47.33 OSA (OBSTRUCTIVE SLEEP APNEA): Primary | ICD-10-CM

## 2020-08-24 NOTE — TELEPHONE ENCOUNTER
Left message for patient to call back. [facility] sent a fax requesting patient repeat sleep study. Patients previous sleep study was 2017 and did not start CPAP therapy at that time.

## 2020-08-24 NOTE — TELEPHONE ENCOUNTER
Because of the better accuracy of the test, would prefer the in lab. For his insurance he would need to do in [facility] or [place]. Order entered for [facility].    If this is not authorized by his insurance, we will then do a home sleep study

## 2020-08-24 NOTE — TELEPHONE ENCOUNTER
Spoke with patient. He would like a home sleep study if possible. He is open to Split night CPAP titration if advised. Patient would like to go to [facility]. Former smoker

## 2020-08-25 ENCOUNTER — ORDER TRANSCRIPTION (OUTPATIENT)
Dept: SLEEP CENTER | Age: 45
End: 2020-08-25

## 2020-08-25 DIAGNOSIS — G47.33 OBSTRUCTIVE SLEEP APNEA (ADULT) (PEDIATRIC): Primary | ICD-10-CM

## 2020-08-26 NOTE — TELEPHONE ENCOUNTER
Per call back; Patient is set up for sleep test Tuesday at 8:00p.m. in [place]. Can be reached back at [phone] if needed.  Thank you

## 2020-08-29 ENCOUNTER — APPOINTMENT (OUTPATIENT)
Dept: LAB | Age: 45
End: 2020-08-29
Attending: NURSE PRACTITIONER
Payer: COMMERCIAL

## 2020-08-29 DIAGNOSIS — G47.33 OBSTRUCTIVE SLEEP APNEA (ADULT) (PEDIATRIC): ICD-10-CM

## 2020-08-31 ENCOUNTER — HOSPITAL ENCOUNTER (OUTPATIENT)
Dept: GENERAL RADIOLOGY | Facility: HOSPITAL | Age: 45
Discharge: HOME | End: 2020-08-31
Attending: ORTHOPAEDIC SURGERY
Payer: COMMERCIAL

## 2020-08-31 ENCOUNTER — TELEPHONE (OUTPATIENT)
Dept: FAMILY MEDICINE CLINIC | Facility: CLINIC | Age: 45
End: 2020-08-31

## 2020-08-31 ENCOUNTER — OFFICE VISIT (OUTPATIENT)
Dept: ORTHOPEDICS CLINIC | Facility: CLINIC | Age: 45
End: 2020-08-31

## 2020-08-31 VITALS — WEIGHT: 206 LBS | HEIGHT: 69 IN | BODY MASS INDEX: 30.51 KG/M2

## 2020-08-31 DIAGNOSIS — M25.561 CHRONIC PAIN OF RIGHT KNEE: ICD-10-CM

## 2020-08-31 DIAGNOSIS — M17.31 POST-TRAUMATIC OSTEOARTHRITIS OF RIGHT KNEE: Primary | ICD-10-CM

## 2020-08-31 DIAGNOSIS — G89.29 CHRONIC PAIN OF RIGHT KNEE: ICD-10-CM

## 2020-08-31 LAB — SARS-COV-2 RNA RESP QL NAA+PROBE: NOT DETECTED

## 2020-08-31 PROCEDURE — 73562 X-RAY EXAM OF KNEE 3: CPT | Performed by: ORTHOPAEDIC SURGERY

## 2020-08-31 PROCEDURE — 99244 OFF/OP CNSLTJ NEW/EST MOD 40: CPT | Performed by: ORTHOPAEDIC SURGERY

## 2020-08-31 PROCEDURE — 3008F BODY MASS INDEX DOCD: CPT | Performed by: ORTHOPAEDIC SURGERY

## 2020-08-31 NOTE — TELEPHONE ENCOUNTER
----- Message from MARIE Milton sent at 8/31/2020  8:59 AM CDT -----  Please let patient know the Covid test is negative. Thank you.
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
## 2020-09-01 ENCOUNTER — OFFICE VISIT (OUTPATIENT)
Dept: SLEEP CENTER | Age: 45
End: 2020-09-01
Attending: NURSE PRACTITIONER
Payer: COMMERCIAL

## 2020-09-01 ENCOUNTER — TELEPHONE (OUTPATIENT)
Dept: ORTHOPEDICS CLINIC | Facility: CLINIC | Age: 45
End: 2020-09-01

## 2020-09-01 DIAGNOSIS — F32.A DEPRESSION, UNSPECIFIED DEPRESSION TYPE: ICD-10-CM

## 2020-09-01 DIAGNOSIS — G47.09 OTHER INSOMNIA: ICD-10-CM

## 2020-09-01 DIAGNOSIS — F43.10 PTSD (POST-TRAUMATIC STRESS DISORDER): ICD-10-CM

## 2020-09-01 DIAGNOSIS — G47.33 OSA (OBSTRUCTIVE SLEEP APNEA): ICD-10-CM

## 2020-09-01 PROCEDURE — 95811 POLYSOM 6/>YRS CPAP 4/> PARM: CPT

## 2020-09-01 NOTE — TELEPHONE ENCOUNTER
Spoke with pt in office, reviewed pre op check list and med list with pt, informed Dr Jessica overton  will contact him to set up a surgery date, pt verbalized understanding and has MRI scheduled for 9/9/2020.  Surgery form sent to Dr Jessica Rust

## 2020-09-03 PROBLEM — Z87.898 HISTORY OF SEIZURE: Status: ACTIVE | Noted: 2020-09-03

## 2020-09-03 PROBLEM — F43.10 POST TRAUMATIC STRESS DISORDER: Status: ACTIVE | Noted: 2020-09-03

## 2020-09-03 PROBLEM — Z87.820 HISTORY OF MULTIPLE CONCUSSIONS: Status: ACTIVE | Noted: 2020-09-03

## 2020-09-03 PROBLEM — R53.83 FATIGUE: Status: ACTIVE | Noted: 2020-09-03

## 2020-09-03 PROBLEM — F15.20 CAFFEINE DEPENDENCE (HCC): Status: ACTIVE | Noted: 2020-09-03

## 2020-09-03 PROBLEM — G56.03 BILATERAL CARPAL TUNNEL SYNDROME: Status: ACTIVE | Noted: 2019-08-05

## 2020-09-03 PROBLEM — Z79.899 MEDICAL MARIJUANA USE: Status: ACTIVE | Noted: 2020-09-03

## 2020-09-04 ENCOUNTER — LAB ENCOUNTER (OUTPATIENT)
Dept: LAB | Age: 45
End: 2020-09-04
Attending: FAMILY MEDICINE
Payer: COMMERCIAL

## 2020-09-04 DIAGNOSIS — F43.10 POST TRAUMATIC STRESS DISORDER: ICD-10-CM

## 2020-09-04 DIAGNOSIS — Z86.39 HISTORY OF HIGH CHOLESTEROL: ICD-10-CM

## 2020-09-04 DIAGNOSIS — Z13.89 SCREENING FOR SUBSTANCE ABUSE: ICD-10-CM

## 2020-09-04 DIAGNOSIS — G47.00 INSOMNIA, UNSPECIFIED TYPE: ICD-10-CM

## 2020-09-04 DIAGNOSIS — R42 DIZZINESS: ICD-10-CM

## 2020-09-04 DIAGNOSIS — Z13.0 SCREENING FOR DEFICIENCY ANEMIA: ICD-10-CM

## 2020-09-04 DIAGNOSIS — Z86.39 HISTORY OF VITAMIN D DEFICIENCY: ICD-10-CM

## 2020-09-04 DIAGNOSIS — Z13.29 SCREENING FOR THYROID DISORDER: ICD-10-CM

## 2020-09-04 DIAGNOSIS — Z13.1 SCREENING FOR DIABETES MELLITUS: ICD-10-CM

## 2020-09-04 DIAGNOSIS — Z79.899 MEDICAL MARIJUANA USE: ICD-10-CM

## 2020-09-04 DIAGNOSIS — R53.83 FATIGUE, UNSPECIFIED TYPE: ICD-10-CM

## 2020-09-04 LAB
ALBUMIN SERPL-MCNC: 3.7 G/DL (ref 3.4–5)
ALBUMIN/GLOB SERPL: 1.1 {RATIO} (ref 1–2)
ALP LIVER SERPL-CCNC: 73 U/L (ref 45–117)
ALT SERPL-CCNC: 31 U/L (ref 16–61)
ANION GAP SERPL CALC-SCNC: 5 MMOL/L (ref 0–18)
AST SERPL-CCNC: 19 U/L (ref 15–37)
BASOPHILS # BLD AUTO: 0.14 X10(3) UL (ref 0–0.2)
BASOPHILS NFR BLD AUTO: 1.8 %
BILIRUB SERPL-MCNC: 0.3 MG/DL (ref 0.1–2)
BUN BLD-MCNC: 9 MG/DL (ref 7–18)
BUN/CREAT SERPL: 12 (ref 10–20)
CALCIUM BLD-MCNC: 9.3 MG/DL (ref 8.5–10.1)
CHLORIDE SERPL-SCNC: 103 MMOL/L (ref 98–112)
CHOLEST SMN-MCNC: 240 MG/DL (ref ?–200)
CO2 SERPL-SCNC: 27 MMOL/L (ref 21–32)
CREAT BLD-MCNC: 0.75 MG/DL (ref 0.7–1.3)
DEPRECATED RDW RBC AUTO: 44.4 FL (ref 35.1–46.3)
EOSINOPHIL # BLD AUTO: 0.26 X10(3) UL (ref 0–0.7)
EOSINOPHIL NFR BLD AUTO: 3.4 %
ERYTHROCYTE [DISTWIDTH] IN BLOOD BY AUTOMATED COUNT: 13.7 % (ref 11–15)
EST. AVERAGE GLUCOSE BLD GHB EST-MCNC: 120 MG/DL (ref 68–126)
GLOBULIN PLAS-MCNC: 3.3 G/DL (ref 2.8–4.4)
GLUCOSE BLD-MCNC: 90 MG/DL (ref 70–99)
HBA1C MFR BLD HPLC: 5.8 % (ref ?–5.7)
HCT VFR BLD AUTO: 44.3 % (ref 39–53)
HDLC SERPL-MCNC: 51 MG/DL (ref 40–59)
HGB BLD-MCNC: 14.2 G/DL (ref 13–17.5)
IMM GRANULOCYTES # BLD AUTO: 0.02 X10(3) UL (ref 0–1)
IMM GRANULOCYTES NFR BLD: 0.3 %
LDLC SERPL CALC-MCNC: 164 MG/DL (ref ?–100)
LYMPHOCYTES # BLD AUTO: 2.2 X10(3) UL (ref 1–4)
LYMPHOCYTES NFR BLD AUTO: 29 %
M PROTEIN MFR SERPL ELPH: 7 G/DL (ref 6.4–8.2)
MCH RBC QN AUTO: 28.2 PG (ref 26–34)
MCHC RBC AUTO-ENTMCNC: 32.1 G/DL (ref 31–37)
MCV RBC AUTO: 87.9 FL (ref 80–100)
MONOCYTES # BLD AUTO: 0.88 X10(3) UL (ref 0.1–1)
MONOCYTES NFR BLD AUTO: 11.6 %
NEUTROPHILS # BLD AUTO: 4.09 X10 (3) UL (ref 1.5–7.7)
NEUTROPHILS # BLD AUTO: 4.09 X10(3) UL (ref 1.5–7.7)
NEUTROPHILS NFR BLD AUTO: 53.9 %
NONHDLC SERPL-MCNC: 189 MG/DL (ref ?–130)
OSMOLALITY SERPL CALC.SUM OF ELEC: 278 MOSM/KG (ref 275–295)
PATIENT FASTING Y/N/NP: YES
PATIENT FASTING Y/N/NP: YES
PLATELET # BLD AUTO: 305 10(3)UL (ref 150–450)
POTASSIUM SERPL-SCNC: 4.4 MMOL/L (ref 3.5–5.1)
RBC # BLD AUTO: 5.04 X10(6)UL (ref 4.3–5.7)
SODIUM SERPL-SCNC: 135 MMOL/L (ref 136–145)
T4 FREE SERPL-MCNC: 0.9 NG/DL (ref 0.8–1.7)
TRIGL SERPL-MCNC: 127 MG/DL (ref 30–149)
TSI SER-ACNC: 0.86 MIU/ML (ref 0.36–3.74)
VIT D+METAB SERPL-MCNC: 20.3 NG/ML (ref 30–100)
VLDLC SERPL CALC-MCNC: 25 MG/DL (ref 0–30)
WBC # BLD AUTO: 7.6 X10(3) UL (ref 4–11)

## 2020-09-04 PROCEDURE — 80307 DRUG TEST PRSMV CHEM ANLYZR: CPT

## 2020-09-04 PROCEDURE — 85025 COMPLETE CBC W/AUTO DIFF WBC: CPT

## 2020-09-04 PROCEDURE — 80053 COMPREHEN METABOLIC PANEL: CPT

## 2020-09-04 PROCEDURE — 82306 VITAMIN D 25 HYDROXY: CPT

## 2020-09-04 PROCEDURE — 84439 ASSAY OF FREE THYROXINE: CPT

## 2020-09-04 PROCEDURE — 82570 ASSAY OF URINE CREATININE: CPT

## 2020-09-04 PROCEDURE — 36415 COLL VENOUS BLD VENIPUNCTURE: CPT

## 2020-09-04 PROCEDURE — 83036 HEMOGLOBIN GLYCOSYLATED A1C: CPT

## 2020-09-04 PROCEDURE — 84443 ASSAY THYROID STIM HORMONE: CPT

## 2020-09-04 PROCEDURE — 80061 LIPID PANEL: CPT

## 2020-09-04 PROCEDURE — 80349 CANNABINOIDS NATURAL: CPT

## 2020-09-06 LAB
CREAT UR-SCNC: 13.7 MG/DL
ETHYL GLUCURONIDE SCRN, URINE: NEGATIVE NG/ML

## 2020-09-09 ENCOUNTER — HOSPITAL ENCOUNTER (OUTPATIENT)
Dept: MRI IMAGING | Facility: HOSPITAL | Age: 45
Discharge: HOME | End: 2020-09-09
Attending: ORTHOPAEDIC SURGERY
Payer: COMMERCIAL

## 2020-09-09 DIAGNOSIS — M25.561 CHRONIC PAIN OF RIGHT KNEE: ICD-10-CM

## 2020-09-09 DIAGNOSIS — M17.31 POST-TRAUMATIC OSTEOARTHRITIS OF RIGHT KNEE: ICD-10-CM

## 2020-09-09 DIAGNOSIS — G89.29 CHRONIC PAIN OF RIGHT KNEE: ICD-10-CM

## 2020-09-09 PROCEDURE — 73721 MRI JNT OF LWR EXTRE W/O DYE: CPT | Performed by: ORTHOPAEDIC SURGERY

## 2020-09-11 PROBLEM — R73.09 ELEVATED HEMOGLOBIN A1C: Status: ACTIVE | Noted: 2020-09-11

## 2020-09-11 LAB
AMPHET UR QL SCN: NEGATIVE
BARBITURATES UR QL SCN: NEGATIVE
BENZODIAZ UR QL SCN: NEGATIVE
COCAINE UR QL: NEGATIVE
CREAT UR-SCNC: 13.7 MG/DL
DRUG CONFIRMATION,CANNABINOIDS: >500
DRUG CONFIRMATION,CANNABINOIDS: >500 NG/ML
ETHANOL UR-MCNC: NEGATIVE MG/DL
OPIATES UR QL SCN: NEGATIVE
PCP UR QL SCN: NEGATIVE
THC/CREAT UR: >3650 NG TCH/MG CREAT

## 2020-09-15 PROBLEM — F32.A DEPRESSION: Status: RESOLVED | Noted: 2019-08-05 | Resolved: 2020-09-15

## 2020-09-18 ENCOUNTER — TELEPHONE (OUTPATIENT)
Dept: FAMILY MEDICINE CLINIC | Facility: CLINIC | Age: 45
End: 2020-09-18

## 2020-09-18 DIAGNOSIS — G47.33 OSA (OBSTRUCTIVE SLEEP APNEA): Primary | ICD-10-CM

## 2020-09-29 NOTE — TELEPHONE ENCOUNTER
Vinny Murphy is requiring a new psg still. Pt unable to complete in-lab psg.  Please place order for HST

## 2020-09-30 NOTE — TELEPHONE ENCOUNTER
Spoke with patient. He will complete the HST.  Asked to setup a date and time and send him a [employer] message

## 2020-10-01 ENCOUNTER — OFFICE VISIT (OUTPATIENT)
Dept: FAMILY MEDICINE CLINIC | Facility: CLINIC | Age: 45
End: 2020-10-01

## 2020-10-01 ENCOUNTER — APPOINTMENT (OUTPATIENT)
Dept: LAB | Age: 45
End: 2020-10-01
Attending: FAMILY MEDICINE
Payer: COMMERCIAL

## 2020-10-01 VITALS
HEART RATE: 79 BPM | DIASTOLIC BLOOD PRESSURE: 78 MMHG | SYSTOLIC BLOOD PRESSURE: 136 MMHG | HEIGHT: 69 IN | TEMPERATURE: 98 F | BODY MASS INDEX: 31.4 KG/M2 | OXYGEN SATURATION: 98 % | RESPIRATION RATE: 20 BRPM | WEIGHT: 212 LBS

## 2020-10-01 DIAGNOSIS — G89.29 CHRONIC PAIN OF RIGHT KNEE: ICD-10-CM

## 2020-10-01 DIAGNOSIS — Z01.818 PREOPERATIVE EXAMINATION: Primary | ICD-10-CM

## 2020-10-01 DIAGNOSIS — E78.00 HYPERCHOLESTEROLEMIA: ICD-10-CM

## 2020-10-01 DIAGNOSIS — M25.561 CHRONIC PAIN OF RIGHT KNEE: ICD-10-CM

## 2020-10-01 PROCEDURE — 85025 COMPLETE CBC W/AUTO DIFF WBC: CPT | Performed by: FAMILY MEDICINE

## 2020-10-01 PROCEDURE — 3075F SYST BP GE 130 - 139MM HG: CPT | Performed by: FAMILY MEDICINE

## 2020-10-01 PROCEDURE — 80053 COMPREHEN METABOLIC PANEL: CPT | Performed by: FAMILY MEDICINE

## 2020-10-01 PROCEDURE — 3078F DIAST BP <80 MM HG: CPT | Performed by: FAMILY MEDICINE

## 2020-10-01 PROCEDURE — 99214 OFFICE O/P EST MOD 30 MIN: CPT | Performed by: FAMILY MEDICINE

## 2020-10-01 PROCEDURE — 36415 COLL VENOUS BLD VENIPUNCTURE: CPT | Performed by: FAMILY MEDICINE

## 2020-10-01 PROCEDURE — 3008F BODY MASS INDEX DOCD: CPT | Performed by: FAMILY MEDICINE

## 2020-10-01 RX ORDER — ATORVASTATIN CALCIUM 10 MG/1
10 TABLET, FILM COATED ORAL NIGHTLY
Qty: 30 TABLET | Refills: 2 | Status: SHIPPED | OUTPATIENT
Start: 2020-10-01 | End: 2020-12-29

## 2020-10-01 NOTE — PROGRESS NOTES
[facility]  [facility]    Chief Complaint:   Patient presents with:  Pre-Op Exam      HPI:   Lorenzo Alonzo is a 54year old male with a hx of chronic R knee pain, who presents for a pre-operative physical exam. Patient is to have right (Spine surgeries) Mother         6   • Other (Cystic kidneys) Mother    • Cancer Paternal Grandmother    • Heart Disorder Paternal Grandfather    • Depression Neg    • Bipolar Disorder Neg    • Schizophrenia Neg    • ADHD Neg    • Suicide History Neg HPI  NEUROLOGICAL:  Denies headache, seizures, dizziness, syncope, paralysis, ataxia, numbness or tingling in the extremities,change in bowel or bladder control. HEMATOLOGIC:  Denies anemia, bleeding or bruising.   LYMPHATICS:  Denies enlarged nodes or his gait, strength and tone, sensory intact, normal reflexes.   PSYCHIATRIC: Alert and oriented x 3; affect appropriate, no depressed mood or anxiety      ASSESSMENT AND PLAN:   Santana Hubbard was seen today for pre-op exam.    Diagnoses and all orders for this visit: disorder     Caffeine dependence (HCC)     Fatigue     Elevated hemoglobin A1c      Lore Murry MD  10/1/2020  10:21 AM    This note was created utilizing Dragon speech recognition software.  Please excuse any grammatical errors.  Call my office if you ha

## 2020-10-01 NOTE — PATIENT INSTRUCTIONS
After today's assessment  patient is at optimum health for surgery and relatively at low risk. There are no contraindication for procedure. Recommend to avoid any use of aleve, aspirin or ibuprofen 1 week before surgery. Labs today.      Advice low cho

## 2020-10-02 NOTE — TELEPHONE ENCOUNTER
Surgery: Right total knee arthoplasty  Location-[facility]  Date-10/22/20  PA-authorized  Clearance-cleared by his PCP  [place]-done  Checklist-done

## 2020-10-12 ENCOUNTER — TELEPHONE (OUTPATIENT)
Dept: ORTHOPEDICS CLINIC | Facility: CLINIC | Age: 45
End: 2020-10-12

## 2020-10-13 ENCOUNTER — TELEPHONE (OUTPATIENT)
Dept: ORTHOPEDICS CLINIC | Facility: CLINIC | Age: 45
End: 2020-10-13

## 2020-10-13 NOTE — TELEPHONE ENCOUNTER
Usually patient will receive home PT after TKA that will be set up by social work at the hospital post-op.   If he would like to skip the home therapy we can get him a prescription for PT.

## 2020-10-14 NOTE — TELEPHONE ENCOUNTER
Spoke with patient and he states the hospital advised him to get a jump on getting an order for outpatient PT. I did advise him per Remy's message and he states he did not realize home health would come out and do therapy.   He will do the home health ther

## 2020-10-20 ENCOUNTER — APPOINTMENT (OUTPATIENT)
Dept: LAB | Age: 45
End: 2020-10-20
Attending: ORTHOPAEDIC SURGERY
Payer: COMMERCIAL

## 2020-10-20 ENCOUNTER — LAB ENCOUNTER (OUTPATIENT)
Dept: LAB | Age: 45
End: 2020-10-20
Attending: ORTHOPAEDIC SURGERY
Payer: COMMERCIAL

## 2020-10-20 DIAGNOSIS — Z01.818 PREOPERATIVE TESTING: ICD-10-CM

## 2020-10-20 PROCEDURE — 86901 BLOOD TYPING SEROLOGIC RH(D): CPT

## 2020-10-20 PROCEDURE — 86850 RBC ANTIBODY SCREEN: CPT

## 2020-10-20 PROCEDURE — 87641 MR-STAPH DNA AMP PROBE: CPT

## 2020-10-20 PROCEDURE — 86900 BLOOD TYPING SEROLOGIC ABO: CPT

## 2020-10-21 RX ORDER — TRANEXAMIC ACID 10 MG/ML
1000 INJECTION, SOLUTION INTRAVENOUS ONCE
Status: COMPLETED | OUTPATIENT
Start: 2020-10-22 | End: 2020-10-22

## 2020-10-21 RX ORDER — TRANEXAMIC ACID 10 MG/ML
1000 INJECTION, SOLUTION INTRAVENOUS ONCE
Status: DISCONTINUED | OUTPATIENT
Start: 2020-10-21 | End: 2020-10-21
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
## 2020-10-22 ENCOUNTER — TELEPHONE (OUTPATIENT)
Dept: FAMILY MEDICINE CLINIC | Facility: CLINIC | Age: 45
End: 2020-10-22

## 2020-10-22 ENCOUNTER — APPOINTMENT (OUTPATIENT)
Dept: GENERAL RADIOLOGY | Facility: HOSPITAL | Age: 45
End: 2020-10-22
Attending: ORTHOPAEDIC SURGERY
Payer: COMMERCIAL

## 2020-10-22 ENCOUNTER — ANESTHESIA (OUTPATIENT)
Dept: SURGERY | Facility: HOSPITAL | Age: 45
End: 2020-10-22
Payer: COMMERCIAL

## 2020-10-22 ENCOUNTER — ANESTHESIA EVENT (OUTPATIENT)
Dept: SURGERY | Facility: HOSPITAL | Age: 45
End: 2020-10-22
Payer: COMMERCIAL

## 2020-10-22 ENCOUNTER — HOSPITAL ENCOUNTER (OUTPATIENT)
Facility: HOSPITAL | Age: 45
Discharge: HOME | End: 2020-10-23
Attending: ORTHOPAEDIC SURGERY | Admitting: ORTHOPAEDIC SURGERY
Payer: COMMERCIAL

## 2020-10-22 DIAGNOSIS — Z01.818 PREOPERATIVE TESTING: Primary | ICD-10-CM

## 2020-10-22 PROBLEM — I10 ESSENTIAL HYPERTENSION: Chronic | Status: ACTIVE | Noted: 2020-10-22

## 2020-10-22 PROBLEM — M17.31 POST-TRAUMATIC OSTEOARTHRITIS OF RIGHT KNEE: Status: ACTIVE | Noted: 2020-10-22

## 2020-10-22 PROCEDURE — 3074F SYST BP LT 130 MM HG: CPT | Performed by: HOSPITALIST

## 2020-10-22 PROCEDURE — 3008F BODY MASS INDEX DOCD: CPT | Performed by: HOSPITALIST

## 2020-10-22 PROCEDURE — 99202 OFFICE O/P NEW SF 15 MIN: CPT | Performed by: HOSPITALIST

## 2020-10-22 PROCEDURE — 3079F DIAST BP 80-89 MM HG: CPT | Performed by: HOSPITALIST

## 2020-10-22 PROCEDURE — 73560 X-RAY EXAM OF KNEE 1 OR 2: CPT | Performed by: ORTHOPAEDIC SURGERY

## 2020-10-22 PROCEDURE — 0SRC069 REPLACEMENT OF RIGHT KNEE JOINT WITH OXIDIZED ZIRCONIUM ON POLYETHYLENE SYNTHETIC SUBSTITUTE, CEMENTED, OPEN APPROACH: ICD-10-PCS | Performed by: ORTHOPAEDIC SURGERY

## 2020-10-22 DEVICE — PSN ALL POLY PAT PLY 41MM: Type: IMPLANTABLE DEVICE | Site: KNEE | Status: FUNCTIONAL

## 2020-10-22 DEVICE — PERSONA CM FM/CM TB/VE: Type: IMPLANTABLE DEVICE | Site: KNEE | Status: FUNCTIONAL

## 2020-10-22 DEVICE — PSN FEM PS CMT CCR STD SZ10 R: Type: IMPLANTABLE DEVICE | Site: KNEE | Status: FUNCTIONAL

## 2020-10-22 DEVICE — PSN ASF PS 10MM VE R 10-12 GH: Type: IMPLANTABLE DEVICE | Site: KNEE | Status: FUNCTIONAL

## 2020-10-22 DEVICE — BIOMET BC R 1X40 US: Type: IMPLANTABLE DEVICE | Site: KNEE | Status: FUNCTIONAL

## 2020-10-22 DEVICE — PSN TIB STM 5 DEG SZ G R: Type: IMPLANTABLE DEVICE | Site: KNEE | Status: FUNCTIONAL

## 2020-10-22 RX ORDER — ACETAMINOPHEN 500 MG
1000 TABLET ORAL ONCE
Status: DISCONTINUED | OUTPATIENT
Start: 2020-10-22 | End: 2020-10-22 | Stop reason: HOSPADM

## 2020-10-22 RX ORDER — HYDROCODONE BITARTRATE AND ACETAMINOPHEN 7.5; 325 MG/1; MG/1
1 TABLET ORAL EVERY 6 HOURS PRN
Status: DISCONTINUED | OUTPATIENT
Start: 2020-10-22 | End: 2020-10-23

## 2020-10-22 RX ORDER — DIPHENHYDRAMINE HYDROCHLORIDE 50 MG/ML
25 INJECTION INTRAMUSCULAR; INTRAVENOUS ONCE AS NEEDED
Status: COMPLETED | OUTPATIENT
Start: 2020-10-22 | End: 2020-10-22

## 2020-10-22 RX ORDER — SODIUM PHOSPHATE, DIBASIC AND SODIUM PHOSPHATE, MONOBASIC 7; 19 G/133ML; G/133ML
1 ENEMA RECTAL ONCE AS NEEDED
Status: DISCONTINUED | OUTPATIENT
Start: 2020-10-22 | End: 2020-10-23

## 2020-10-22 RX ORDER — PROCHLORPERAZINE EDISYLATE 5 MG/ML
5 INJECTION INTRAMUSCULAR; INTRAVENOUS ONCE AS NEEDED
Status: ACTIVE | OUTPATIENT
Start: 2020-10-22 | End: 2020-10-22

## 2020-10-22 RX ORDER — DOXEPIN HYDROCHLORIDE 50 MG/1
1 CAPSULE ORAL DAILY
Status: DISCONTINUED | OUTPATIENT
Start: 2020-10-22 | End: 2020-10-23

## 2020-10-22 RX ORDER — HYDROMORPHONE HYDROCHLORIDE 1 MG/ML
0.4 INJECTION, SOLUTION INTRAMUSCULAR; INTRAVENOUS; SUBCUTANEOUS EVERY 5 MIN PRN
Status: DISCONTINUED | OUTPATIENT
Start: 2020-10-22 | End: 2020-10-22 | Stop reason: HOSPADM

## 2020-10-22 RX ORDER — HYDROCODONE BITARTRATE AND ACETAMINOPHEN 5; 325 MG/1; MG/1
1 TABLET ORAL AS NEEDED
Status: DISCONTINUED | OUTPATIENT
Start: 2020-10-22 | End: 2020-10-22 | Stop reason: HOSPADM

## 2020-10-22 RX ORDER — ASPIRIN 325 MG
325 TABLET ORAL 2 TIMES DAILY
Status: DISCONTINUED | OUTPATIENT
Start: 2020-10-22 | End: 2020-10-23

## 2020-10-22 RX ORDER — BISACODYL 10 MG
10 SUPPOSITORY, RECTAL RECTAL
Status: DISCONTINUED | OUTPATIENT
Start: 2020-10-22 | End: 2020-10-23

## 2020-10-22 RX ORDER — ENOXAPARIN SODIUM 100 MG/ML
40 INJECTION SUBCUTANEOUS ONCE
Status: COMPLETED | OUTPATIENT
Start: 2020-10-22 | End: 2020-10-22

## 2020-10-22 RX ORDER — DIPHENHYDRAMINE HYDROCHLORIDE 50 MG/ML
12.5 INJECTION INTRAMUSCULAR; INTRAVENOUS EVERY 4 HOURS PRN
Status: DISCONTINUED | OUTPATIENT
Start: 2020-10-22 | End: 2020-10-23

## 2020-10-22 RX ORDER — PROCHLORPERAZINE EDISYLATE 5 MG/ML
10 INJECTION INTRAMUSCULAR; INTRAVENOUS EVERY 6 HOURS PRN
Status: DISCONTINUED | OUTPATIENT
Start: 2020-10-22 | End: 2020-10-23

## 2020-10-22 RX ORDER — SENNOSIDES 8.6 MG
17.2 TABLET ORAL NIGHTLY
Status: DISCONTINUED | OUTPATIENT
Start: 2020-10-22 | End: 2020-10-23

## 2020-10-22 RX ORDER — NALOXONE HYDROCHLORIDE 0.4 MG/ML
0.08 INJECTION, SOLUTION INTRAMUSCULAR; INTRAVENOUS; SUBCUTANEOUS
Status: ACTIVE | OUTPATIENT
Start: 2020-10-22 | End: 2020-10-23

## 2020-10-22 RX ORDER — DOCUSATE SODIUM 100 MG/1
100 CAPSULE, LIQUID FILLED ORAL 2 TIMES DAILY
Status: DISCONTINUED | OUTPATIENT
Start: 2020-10-22 | End: 2020-10-23

## 2020-10-22 RX ORDER — CALCIUM CARBONATE/VITAMIN D3 250-3.125
1 TABLET ORAL 2 TIMES DAILY WITH MEALS
Status: DISCONTINUED | OUTPATIENT
Start: 2020-10-22 | End: 2020-10-23

## 2020-10-22 RX ORDER — PROCHLORPERAZINE EDISYLATE 5 MG/ML
5 INJECTION INTRAMUSCULAR; INTRAVENOUS ONCE AS NEEDED
Status: DISCONTINUED | OUTPATIENT
Start: 2020-10-22 | End: 2020-10-22 | Stop reason: HOSPADM

## 2020-10-22 RX ORDER — MIDAZOLAM HYDROCHLORIDE 1 MG/ML
INJECTION INTRAMUSCULAR; INTRAVENOUS AS NEEDED
Status: DISCONTINUED | OUTPATIENT
Start: 2020-10-22 | End: 2020-10-22 | Stop reason: SURG

## 2020-10-22 RX ORDER — DIPHENHYDRAMINE HCL 25 MG
25 CAPSULE ORAL EVERY 4 HOURS PRN
Status: DISPENSED | OUTPATIENT
Start: 2020-10-22 | End: 2020-10-23

## 2020-10-22 RX ORDER — CYANOCOBALAMIN (VITAMIN B-12) 1000 MCG
1 TABLET, EXTENDED RELEASE ORAL 2 TIMES DAILY WITH MEALS
Qty: 60 TABLET | Refills: 0 | Status: SHIPPED | OUTPATIENT
Start: 2020-10-22 | End: 2021-06-10

## 2020-10-22 RX ORDER — HYDROMORPHONE HYDROCHLORIDE 1 MG/ML
0.2 INJECTION, SOLUTION INTRAMUSCULAR; INTRAVENOUS; SUBCUTANEOUS EVERY 5 MIN PRN
Status: DISCONTINUED | OUTPATIENT
Start: 2020-10-22 | End: 2020-10-22 | Stop reason: HOSPADM

## 2020-10-22 RX ORDER — HYDROCODONE BITARTRATE AND ACETAMINOPHEN 5; 325 MG/1; MG/1
2 TABLET ORAL AS NEEDED
Status: DISCONTINUED | OUTPATIENT
Start: 2020-10-22 | End: 2020-10-22 | Stop reason: HOSPADM

## 2020-10-22 RX ORDER — MORPHINE SULFATE 1 MG/ML
INJECTION, SOLUTION EPIDURAL; INTRATHECAL; INTRAVENOUS
Status: COMPLETED | OUTPATIENT
Start: 2020-10-22 | End: 2020-10-22

## 2020-10-22 RX ORDER — HYDROMORPHONE HYDROCHLORIDE 1 MG/ML
0.4 INJECTION, SOLUTION INTRAMUSCULAR; INTRAVENOUS; SUBCUTANEOUS
Status: ACTIVE | OUTPATIENT
Start: 2020-10-22 | End: 2020-10-23

## 2020-10-22 RX ORDER — ACETAMINOPHEN 500 MG
1000 TABLET ORAL ONCE
Status: COMPLETED | OUTPATIENT
Start: 2020-10-22 | End: 2020-10-22

## 2020-10-22 RX ORDER — HYDROCODONE BITARTRATE AND ACETAMINOPHEN 5; 325 MG/1; MG/1
1 TABLET ORAL EVERY 6 HOURS PRN
Qty: 30 TABLET | Refills: 0 | Status: SHIPPED | OUTPATIENT
Start: 2020-10-22 | End: 2020-10-23

## 2020-10-22 RX ORDER — HYDROCODONE BITARTRATE AND ACETAMINOPHEN 10; 325 MG/1; MG/1
1 TABLET ORAL EVERY 4 HOURS PRN
Status: DISCONTINUED | OUTPATIENT
Start: 2020-10-22 | End: 2020-10-23

## 2020-10-22 RX ORDER — NAPROXEN 500 MG/1
500 TABLET ORAL 2 TIMES DAILY WITH MEALS
Qty: 60 TABLET | Refills: 0 | Status: SHIPPED | OUTPATIENT
Start: 2020-10-22 | End: 2020-11-24

## 2020-10-22 RX ORDER — GABAPENTIN 600 MG/1
600 TABLET ORAL ONCE
Status: COMPLETED | OUTPATIENT
Start: 2020-10-22 | End: 2020-10-22

## 2020-10-22 RX ORDER — HYDROMORPHONE HYDROCHLORIDE 1 MG/ML
0.6 INJECTION, SOLUTION INTRAMUSCULAR; INTRAVENOUS; SUBCUTANEOUS EVERY 5 MIN PRN
Status: DISCONTINUED | OUTPATIENT
Start: 2020-10-22 | End: 2020-10-22 | Stop reason: HOSPADM

## 2020-10-22 RX ORDER — MORPHINE SULFATE 2 MG/ML
1 INJECTION, SOLUTION INTRAMUSCULAR; INTRAVENOUS EVERY 2 HOUR PRN
Status: DISCONTINUED | OUTPATIENT
Start: 2020-10-22 | End: 2020-10-23

## 2020-10-22 RX ORDER — TRANEXAMIC ACID 10 MG/ML
1000 INJECTION, SOLUTION INTRAVENOUS ONCE
Status: DISCONTINUED | OUTPATIENT
Start: 2020-10-22 | End: 2020-10-22

## 2020-10-22 RX ORDER — NALOXONE HYDROCHLORIDE 0.4 MG/ML
80 INJECTION, SOLUTION INTRAMUSCULAR; INTRAVENOUS; SUBCUTANEOUS AS NEEDED
Status: DISCONTINUED | OUTPATIENT
Start: 2020-10-22 | End: 2020-10-22 | Stop reason: HOSPADM

## 2020-10-22 RX ORDER — HALOPERIDOL 5 MG/ML
0.25 INJECTION INTRAMUSCULAR ONCE AS NEEDED
Status: DISCONTINUED | OUTPATIENT
Start: 2020-10-22 | End: 2020-10-22 | Stop reason: HOSPADM

## 2020-10-22 RX ORDER — NAPROXEN 500 MG/1
500 TABLET ORAL 2 TIMES DAILY WITH MEALS
Status: DISCONTINUED | OUTPATIENT
Start: 2020-10-22 | End: 2020-10-23

## 2020-10-22 RX ORDER — POLYETHYLENE GLYCOL 3350 17 G/17G
17 POWDER, FOR SOLUTION ORAL DAILY PRN
Status: DISCONTINUED | OUTPATIENT
Start: 2020-10-22 | End: 2020-10-23

## 2020-10-22 RX ORDER — ONDANSETRON 2 MG/ML
4 INJECTION INTRAMUSCULAR; INTRAVENOUS ONCE AS NEEDED
Status: ACTIVE | OUTPATIENT
Start: 2020-10-22 | End: 2020-10-22

## 2020-10-22 RX ORDER — SODIUM CHLORIDE 9 MG/ML
INJECTION, SOLUTION INTRAVENOUS CONTINUOUS
Status: DISCONTINUED | OUTPATIENT
Start: 2020-10-22 | End: 2020-10-23

## 2020-10-22 RX ORDER — MULTIVIT-MIN/FA/LYCOPEN/LUTEIN .4-300-25
1 TABLET ORAL DAILY
Qty: 30 TABLET | Refills: 0 | Status: SHIPPED | OUTPATIENT
Start: 2020-10-22

## 2020-10-22 RX ORDER — CETIRIZINE HYDROCHLORIDE 10 MG/1
10 TABLET ORAL DAILY PRN
Status: DISCONTINUED | OUTPATIENT
Start: 2020-10-22 | End: 2020-10-23

## 2020-10-22 RX ORDER — MORPHINE SULFATE 10 MG/ML
6 INJECTION, SOLUTION INTRAMUSCULAR; INTRAVENOUS EVERY 10 MIN PRN
Status: DISCONTINUED | OUTPATIENT
Start: 2020-10-22 | End: 2020-10-22 | Stop reason: HOSPADM

## 2020-10-22 RX ORDER — SODIUM CHLORIDE, SODIUM LACTATE, POTASSIUM CHLORIDE, CALCIUM CHLORIDE 600; 310; 30; 20 MG/100ML; MG/100ML; MG/100ML; MG/100ML
INJECTION, SOLUTION INTRAVENOUS CONTINUOUS
Status: DISCONTINUED | OUTPATIENT
Start: 2020-10-22 | End: 2020-10-22 | Stop reason: HOSPADM

## 2020-10-22 RX ORDER — MORPHINE SULFATE 4 MG/ML
2 INJECTION, SOLUTION INTRAMUSCULAR; INTRAVENOUS EVERY 10 MIN PRN
Status: DISCONTINUED | OUTPATIENT
Start: 2020-10-22 | End: 2020-10-22 | Stop reason: HOSPADM

## 2020-10-22 RX ORDER — HYDROMORPHONE HYDROCHLORIDE 1 MG/ML
0.6 INJECTION, SOLUTION INTRAMUSCULAR; INTRAVENOUS; SUBCUTANEOUS
Status: DISPENSED | OUTPATIENT
Start: 2020-10-22 | End: 2020-10-23

## 2020-10-22 RX ORDER — DULOXETIN HYDROCHLORIDE 60 MG/1
60 CAPSULE, DELAYED RELEASE ORAL EVERY MORNING
Status: DISCONTINUED | OUTPATIENT
Start: 2020-10-22 | End: 2020-10-23

## 2020-10-22 RX ORDER — DIPHENHYDRAMINE HCL 25 MG
50 CAPSULE ORAL NIGHTLY PRN
Status: DISCONTINUED | OUTPATIENT
Start: 2020-10-22 | End: 2020-10-23

## 2020-10-22 RX ORDER — MORPHINE SULFATE 4 MG/ML
4 INJECTION, SOLUTION INTRAMUSCULAR; INTRAVENOUS EVERY 2 HOUR PRN
Status: DISCONTINUED | OUTPATIENT
Start: 2020-10-22 | End: 2020-10-23

## 2020-10-22 RX ORDER — FAMOTIDINE 20 MG/1
20 TABLET ORAL ONCE
Status: COMPLETED | OUTPATIENT
Start: 2020-10-22 | End: 2020-10-22

## 2020-10-22 RX ORDER — DIPHENHYDRAMINE HCL 25 MG
25 CAPSULE ORAL EVERY 4 HOURS PRN
Status: DISCONTINUED | OUTPATIENT
Start: 2020-10-22 | End: 2020-10-23

## 2020-10-22 RX ORDER — ACETAMINOPHEN 325 MG/1
650 TABLET ORAL EVERY 6 HOURS PRN
Status: ACTIVE | OUTPATIENT
Start: 2020-10-22 | End: 2020-10-23

## 2020-10-22 RX ORDER — METOCLOPRAMIDE 10 MG/1
10 TABLET ORAL ONCE
Status: COMPLETED | OUTPATIENT
Start: 2020-10-22 | End: 2020-10-22

## 2020-10-22 RX ORDER — CEFAZOLIN SODIUM/WATER 2 G/20 ML
2 SYRINGE (ML) INTRAVENOUS ONCE
Status: COMPLETED | OUTPATIENT
Start: 2020-10-22 | End: 2020-10-22

## 2020-10-22 RX ORDER — CEFAZOLIN SODIUM/WATER 2 G/20 ML
2 SYRINGE (ML) INTRAVENOUS EVERY 8 HOURS
Status: COMPLETED | OUTPATIENT
Start: 2020-10-22 | End: 2020-10-23

## 2020-10-22 RX ORDER — ASPIRIN 325 MG
325 TABLET, DELAYED RELEASE (ENTERIC COATED) ORAL 2 TIMES DAILY
Qty: 60 TABLET | Refills: 0 | Status: SHIPPED | OUTPATIENT
Start: 2020-10-22 | End: 2020-12-29 | Stop reason: ALTCHOICE

## 2020-10-22 RX ORDER — MORPHINE SULFATE 4 MG/ML
4 INJECTION, SOLUTION INTRAMUSCULAR; INTRAVENOUS EVERY 10 MIN PRN
Status: DISCONTINUED | OUTPATIENT
Start: 2020-10-22 | End: 2020-10-22 | Stop reason: HOSPADM

## 2020-10-22 RX ORDER — HALOPERIDOL 5 MG/ML
0.5 INJECTION INTRAMUSCULAR ONCE AS NEEDED
Status: ACTIVE | OUTPATIENT
Start: 2020-10-22 | End: 2020-10-22

## 2020-10-22 RX ORDER — ONDANSETRON 2 MG/ML
4 INJECTION INTRAMUSCULAR; INTRAVENOUS EVERY 4 HOURS PRN
Status: DISCONTINUED | OUTPATIENT
Start: 2020-10-22 | End: 2020-10-23

## 2020-10-22 RX ORDER — CELECOXIB 200 MG/1
200 CAPSULE ORAL ONCE
Status: COMPLETED | OUTPATIENT
Start: 2020-10-22 | End: 2020-10-22

## 2020-10-22 RX ORDER — MORPHINE SULFATE 2 MG/ML
2 INJECTION, SOLUTION INTRAMUSCULAR; INTRAVENOUS EVERY 2 HOUR PRN
Status: DISCONTINUED | OUTPATIENT
Start: 2020-10-22 | End: 2020-10-23

## 2020-10-22 RX ORDER — DIPHENHYDRAMINE HYDROCHLORIDE 50 MG/ML
12.5 INJECTION INTRAMUSCULAR; INTRAVENOUS EVERY 4 HOURS PRN
Status: ACTIVE | OUTPATIENT
Start: 2020-10-22 | End: 2020-10-23

## 2020-10-22 RX ORDER — DIPHENHYDRAMINE HYDROCHLORIDE 50 MG/ML
25 INJECTION INTRAMUSCULAR; INTRAVENOUS ONCE
Status: COMPLETED | OUTPATIENT
Start: 2020-10-22 | End: 2020-10-22

## 2020-10-22 RX ORDER — LIDOCAINE HYDROCHLORIDE 10 MG/ML
INJECTION, SOLUTION INFILTRATION; PERINEURAL
Status: COMPLETED | OUTPATIENT
Start: 2020-10-22 | End: 2020-10-22

## 2020-10-22 RX ORDER — HYDROCODONE BITARTRATE AND ACETAMINOPHEN 7.5; 325 MG/1; MG/1
2 TABLET ORAL EVERY 6 HOURS PRN
Status: DISCONTINUED | OUTPATIENT
Start: 2020-10-22 | End: 2020-10-23

## 2020-10-22 RX ORDER — BUPIVACAINE HYDROCHLORIDE 7.5 MG/ML
INJECTION, SOLUTION INTRASPINAL
Status: COMPLETED | OUTPATIENT
Start: 2020-10-22 | End: 2020-10-22

## 2020-10-22 RX ORDER — ONDANSETRON 2 MG/ML
4 INJECTION INTRAMUSCULAR; INTRAVENOUS ONCE AS NEEDED
Status: DISCONTINUED | OUTPATIENT
Start: 2020-10-22 | End: 2020-10-22 | Stop reason: HOSPADM

## 2020-10-22 RX ORDER — ATORVASTATIN CALCIUM 10 MG/1
10 TABLET, FILM COATED ORAL NIGHTLY
Status: DISCONTINUED | OUTPATIENT
Start: 2020-10-22 | End: 2020-10-23

## 2020-10-22 RX ORDER — OXYCODONE HCL 10 MG/1
10 TABLET, FILM COATED, EXTENDED RELEASE ORAL ONCE
Status: COMPLETED | OUTPATIENT
Start: 2020-10-22 | End: 2020-10-22

## 2020-10-22 RX ADMIN — CEFAZOLIN SODIUM/WATER 2 G: 2 G/20 ML SYRINGE (ML) INTRAVENOUS at 07:54:00

## 2020-10-22 RX ADMIN — LIDOCAINE HYDROCHLORIDE 3 ML: 10 INJECTION, SOLUTION INFILTRATION; PERINEURAL at 07:43:00

## 2020-10-22 RX ADMIN — TRANEXAMIC ACID 1000 MG: 10 INJECTION, SOLUTION INTRAVENOUS at 09:28:00

## 2020-10-22 RX ADMIN — MORPHINE SULFATE 0.3 MG: 1 INJECTION, SOLUTION EPIDURAL; INTRATHECAL; INTRAVENOUS at 07:43:00

## 2020-10-22 RX ADMIN — MIDAZOLAM HYDROCHLORIDE 2 MG: 1 INJECTION INTRAMUSCULAR; INTRAVENOUS at 07:40:00

## 2020-10-22 RX ADMIN — TRANEXAMIC ACID 1000 MG: 10 INJECTION, SOLUTION INTRAVENOUS at 08:00:00

## 2020-10-22 RX ADMIN — SODIUM CHLORIDE, SODIUM LACTATE, POTASSIUM CHLORIDE, CALCIUM CHLORIDE: 600; 310; 30; 20 INJECTION, SOLUTION INTRAVENOUS at 09:47:00

## 2020-10-22 RX ADMIN — BUPIVACAINE HYDROCHLORIDE 1.7 ML: 7.5 INJECTION, SOLUTION INTRASPINAL at 07:43:00

## 2020-10-22 NOTE — PROGRESS NOTES
[facility]    Progress Note    Dayami Marcum Patient Status:  Outpatient in a Bed    1975 MRN [id number]   Location [facility] 4W/SW/SE Attending Emeka Alberto MD   Hosp Day # 0 PCP Magdiel Muñoz MD        Subjective: comorbidity  MONITOR       BLAKE (obstructive sleep apnea)  BLAKE PROTOCOL, MONITOR. Hypercholesterolemia  CONT HOME MEDS. Essential hypertension  CONT HOME MEDS, MONITOR.              Results:     Lab Results   Component Value Date    WBC 7.4 10/01

## 2020-10-22 NOTE — ANESTHESIA PROCEDURE NOTES
Spinal Block    Date/Time: 10/22/2020 7:43 AM  Performed by: Ramila Hatfield MD  Authorized by: Ramila Hatfield MD       General Information and Staff    Start Time:   Anesthesiologist: Ramila Hatfield MD  Performed by:   Anesthesiologist  Patient Lennox Donning

## 2020-10-22 NOTE — INTERVAL H&P NOTE
Pre-op Diagnosis: right knee post traumatic    The above referenced H&P was reviewed by Yancy Oglesby MD on 10/22/2020, the patient was examined and no significant changes have occurred in the patient's condition since the H&P was performed.   I discusse
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
## 2020-10-22 NOTE — ANESTHESIA POSTPROCEDURE EVALUATION
Patient: Torie Garcia    Procedure Summary     Date: 10/22/20 Room / Location: [facility] OR 04 / [facility] OR    Anesthesia Start: 1166 Anesthesia Stop: 2944    Procedure: KNEE TOTAL REPLACEMENT (Right Knee) Diagnosis: (right knee post traumatic osteoarthr

## 2020-10-22 NOTE — BRIEF OP NOTE
Pre-Operative Diagnosis: right knee post traumatic osteoarthritis     Post-Operative Diagnosis: Same     Procedure Performed: right total knee arthroplasty with MRI templated Patient Specific Instruments    Surgeon(s) and Kiana Armstrong MD - [facility]

## 2020-10-22 NOTE — ANESTHESIA PREPROCEDURE EVALUATION
Anesthesia PreOp Note    HPI:     Sidra Alvarez is a 54year old male who presents for preoperative consultation requested by: Gerson Solis MD    Date of Surgery: 10/22/2020    Procedure(s):  KNEE TOTAL REPLACEMENT  Indication: right knee post trauma not have a C-Pap   • Visual impairment     glasses       Past Surgical History:   Procedure Laterality Date   • OTHER SURGICAL HISTORY  1990    knee scope - right   • OTHER SURGICAL HISTORY  2008    left hand abscess removal   • OTHER SURGICAL HISTORY  197 swell  Grass                   ITCHING  Tree Extract            ITCHING    Family History   Problem Relation Age of Onset   • Cancer Father         colon   • Diabetes Father    • Heart Disorder Father         CABG, stents   • Hypertension Father    • Psych Lifestyle      Physical activity        Days per week: Not on file        Minutes per session: Not on file      Stress: Not on file    Relationships      Social connections        Talks on phone: Not on file        Gets together: Not on file        Attends Height: 1.778 m (5' 10\")         Anesthesia Evaluation     Patient summary reviewed and Nursing notes reviewed    Airway   Mallampati: II  TM distance: >3 FB  Neck ROM: full  Dental      Pulmonary - normal exam    breath sounds clear to auscultation  (+

## 2020-10-22 NOTE — CM/SW NOTE
MICHELLE met with the pt. And his wife At bedside. The pt. Lives with his wife and mother in a ranch home with a basement. The pt. And his wife live in the basement, which is 15 stairs down. The pt.  Reports being independent prior to admission with adls, ambu

## 2020-10-23 VITALS
DIASTOLIC BLOOD PRESSURE: 88 MMHG | TEMPERATURE: 100 F | WEIGHT: 211 LBS | HEART RATE: 94 BPM | BODY MASS INDEX: 30.21 KG/M2 | SYSTOLIC BLOOD PRESSURE: 129 MMHG | RESPIRATION RATE: 20 BRPM | OXYGEN SATURATION: 98 % | HEIGHT: 70 IN

## 2020-10-23 PROCEDURE — 99214 OFFICE O/P EST MOD 30 MIN: CPT | Performed by: HOSPITALIST

## 2020-10-23 RX ORDER — CYCLOBENZAPRINE HCL 10 MG
10 TABLET ORAL 2 TIMES DAILY PRN
Status: DISCONTINUED | OUTPATIENT
Start: 2020-10-23 | End: 2020-10-23

## 2020-10-23 RX ORDER — CYCLOBENZAPRINE HCL 10 MG
10 TABLET ORAL 2 TIMES DAILY PRN
Qty: 30 TABLET | Refills: 0 | Status: SHIPPED | OUTPATIENT
Start: 2020-10-23 | End: 2021-06-10

## 2020-10-23 RX ORDER — HYDROCODONE BITARTRATE AND ACETAMINOPHEN 10; 325 MG/1; MG/1
2 TABLET ORAL EVERY 4 HOURS PRN
Status: DISCONTINUED | OUTPATIENT
Start: 2020-10-23 | End: 2020-10-23

## 2020-10-23 RX ORDER — HYDROCODONE BITARTRATE AND ACETAMINOPHEN 10; 325 MG/1; MG/1
1 TABLET ORAL EVERY 4 HOURS PRN
Status: DISCONTINUED | OUTPATIENT
Start: 2020-10-23 | End: 2020-10-23

## 2020-10-23 RX ORDER — DIPHENHYDRAMINE HYDROCHLORIDE 50 MG/ML
25 INJECTION INTRAMUSCULAR; INTRAVENOUS ONCE
Status: COMPLETED | OUTPATIENT
Start: 2020-10-23 | End: 2020-10-23

## 2020-10-23 RX ORDER — CYCLOBENZAPRINE HCL 10 MG
10 TABLET ORAL 2 TIMES DAILY PRN
Qty: 30 TABLET | Refills: 0 | Status: SHIPPED | OUTPATIENT
Start: 2020-10-23 | End: 2020-10-23

## 2020-10-23 RX ORDER — HYDROCODONE BITARTRATE AND ACETAMINOPHEN 10; 325 MG/1; MG/1
1 TABLET ORAL EVERY 4 HOURS PRN
Qty: 30 TABLET | Refills: 0 | Status: SHIPPED | OUTPATIENT
Start: 2020-10-23 | End: 2020-12-29 | Stop reason: ALTCHOICE

## 2020-10-23 RX ORDER — ONDANSETRON 4 MG/1
4 TABLET, ORALLY DISINTEGRATING ORAL EVERY 8 HOURS PRN
Qty: 20 TABLET | Refills: 0 | Status: SHIPPED | OUTPATIENT
Start: 2020-10-23 | End: 2020-12-29 | Stop reason: ALTCHOICE

## 2020-10-23 NOTE — CM/SW NOTE
MICHELLE followed up on DC planning. [facility] referrals in [place] reopened due to no accepting [facility] agencies. MICHELLE attached [facility] orders and sent two more referrals - pending acceptance     Addendum, 10/27/2020  11:44AM  MICHELLE followed up on DC.  No additional C had

## 2020-10-23 NOTE — DISCHARGE SUMMARY
[place] HOSPITALIST  DISCHARGE SUMMARY     Jorge Nowak Patient Status:  Outpatient in a Bed    1975 MRN [id number]   Location [facility] 4W/SW/SE Attending No att. providers found   Hosp Day # 0 PCP Ollie Montesinos MD     DATE OF ADMISSION peripheral edema. Inc c/d/i  Skin: Skin is warm and dry. No rashes, erythema, diaphoresis. Psych: Normal mood and affect.  Behavior and judgment normal.     DISCHARGE MEDICATIONS     Discharge Medications      START taking these medications      Instructi diphenhydrAMINE HCl 50 MG Caps  Commonly known as: BENADRYL      Take 50 mg by mouth nightly as needed for Sleep. Refills: 0     NON FORMULARY      daily.  Medical marijuana   Refills: 0     Prazosin HCl 2 MG Caps  Commonly known as: MINIPRESS      Take

## 2020-10-23 NOTE — OCCUPATIONAL THERAPY NOTE
OCCUPATIONAL THERAPY EVALUATION - INPATIENT     Room Number: 431/431-A  Evaluation Date: 10/23/2020  Type of Evaluation: Initial  Presenting Problem: (s/p R TKA)    Physician Order: IP Consult to Occupational Therapy  Reason for Therapy: ADL/IADL Dysfunc excess calories without serious comorbidity    BLAKE (obstructive sleep apnea)    Hypercholesterolemia    Essential hypertension      Past Medical History  Past Medical History:   Diagnosis Date   • Depression 8/5/2019   • Essential hypertension 10/22/2020 urinal? : None  -   Putting on and taking off regular upper body clothing?: None  -   Taking care of personal grooming such as brushing teeth?: None  -   Eating meals?: None    AM-PAC Score:  Score: 22  Approx Degree of Impairment: 25.8%  Standardized Scor

## 2020-10-23 NOTE — OPERATIVE REPORT
[facility]    PATIENT'S NAME: [de-identified], ESCOBAR JOSHUA   ATTENDING PHYSICIAN: Aziza Oseguera MD   OPERATING PHYSICIAN: Alla Smart MD   PATIENT ACCOUNT#:   [id number]    LOCATION:  [address] [place] [address] RECORD #:   [id number]       DATE OF BIRTH: then placed under monitored sedation. Tourniquet was placed on the upper right thigh. He requested no Mann. SCD was on the left leg.   His right lower extremity was then prepped and draped in a sterile fashion with Betadine scrub and paint followed by THOM without liftoff. Proper rollback was noted as well. The patella was tracking well. No releases were needed. The pegs were drilled for the femur and the stem prepared for the tibia.   Two batches of Biomet methylmethacrylate cement were mixed in a vacu

## 2020-10-23 NOTE — PHYSICAL THERAPY NOTE
PHYSICAL THERAPY KNEE TREATMENT NOTE - INPATIENT     Room Number: 431/431-A       Presenting Problem: R TKA    Problem List  Principal Problem:    Post-traumatic osteoarthritis of right knee  Active Problems:    Obesity due to excess calories without tom '6-Clicks' INPATIENT SHORT FORM - BASIC MOBILITY  How much difficulty does the patient currently have. ..  -   Turning over in bed (including adjusting bedclothes, sheets and blankets)?: None   -   Sitting down on and standing up from a chair with arms (e.g device and supervision   Goal #4   Current Status 8 stairs with 1 HR Min A   Goal #5  AROM 0 degrees extension to 95 degrees flexion     Goal #5   Current Status In progress   Goal #6 Patient independently performs home exercise program for ROM/strengtheni

## 2020-10-23 NOTE — PLAN OF CARE
Up X1 SB. Minimal pain r/t duramorph. Ancef 2g.  Gel packs. Voiding. Home W/ [place] tomorrow. [place] prn.   Itching tx'd w/ benadryl  Problem: PAIN - ADULT  Goal: Verbalizes/displays adequate comfort level or patient's stated pain goal  Description: INTERVENT Include patient/family/discharge partner in discharge planning  - Arrange for needed discharge resources and transportation as appropriate  - Identify discharge learning needs (meds, wound care, etc)  - Arrange for interpreters to assist at discharge as ne

## 2020-10-23 NOTE — PROGRESS NOTES
[facility]  Anesthesiology Epidural Follow-up Note  10/23/2020    Patient name: Sam Stratford 54year old male  : 1975  MRN: [id number]    Diagnosis: [unfilled]    S/P: knee replacement    Pain treatment modality: Duramorph    Current

## 2020-10-23 NOTE — PLAN OF CARE
Problem: Patient Centered Care  Goal: Patient preferences are identified and integrated in the patient's plan of care  Description: Interventions:  - What would you like us to know as we care for you?   - Provide timely, complete, and accurate informatio growth factors as ordered  - Implement neutropenic guidelines  Outcome: Adequate for Discharge     Problem: SAFETY ADULT - FALL  Goal: Free from fall injury  Description: INTERVENTIONS:  - Assess pt frequently for physical needs  - Identify cognitive and p

## 2020-10-23 NOTE — PROGRESS NOTES
[facility]    Progress Note    Sidra Alvarez Patient Status:  Outpatient in a Bed    1975 MRN [id number]   Location [facility] 4W/SW/SE Attending Vinny Ojeda MD   Hosp Day # 0 PCP Real Riojas MD        Subjective: Paco Reyes MD on 10/22/2020 at 1:19 PM     Finalized by (CST): Paco Reyes MD on 10/22/2020 at 1:20 PM                       Edwin Lozano MD  10/23/2020

## 2020-10-25 NOTE — DISCHARGE SUMMARY
[facility]    PATIENT'S NAME: ESCOBAR Stubbs   ATTENDING PHYSICIAN: Krishan Heller MD   PATIENT ACCOUNT#:   [id number]    LOCATION:  [address] [place] [address] Road #:   [id number]       YOB: 1975  ADMISSION DATE:       10/22/2020

## 2020-10-27 ENCOUNTER — TELEPHONE (OUTPATIENT)
Dept: ORTHOPEDICS CLINIC | Facility: CLINIC | Age: 45
End: 2020-10-27

## 2020-10-27 DIAGNOSIS — G89.29 CHRONIC PAIN OF RIGHT KNEE: ICD-10-CM

## 2020-10-27 DIAGNOSIS — M25.561 CHRONIC PAIN OF RIGHT KNEE: ICD-10-CM

## 2020-10-27 DIAGNOSIS — M17.31 POST-TRAUMATIC OSTEOARTHRITIS OF RIGHT KNEE: Primary | ICD-10-CM

## 2020-10-27 NOTE — TELEPHONE ENCOUNTER
S/w pt spouse who states pt has not had any HH PT yet and no one has called them. Called  and s/w Ashley Gilmore and she states there may be an issue with getting [facility] for pt d/t HMO insurance and [place] [facility]  She will look into this and

## 2020-10-27 NOTE — TELEPHONE ENCOUNTER
Spoke with patient spouse and informed her that home health may or may not be able to get patient in for appointments. Informed her that Dr. Lizz Martinez did put an order in for outpatient physical therapy if transportation could be arranged.   She states she

## 2020-10-27 NOTE — TELEPHONE ENCOUNTER
Per pt's wife, wanting to know when physical therapy needs start. She is not sure if she needs to be the one to set up, or if Dr. Franki Drake office can help, confused on the matter.  Please advise

## 2020-10-27 NOTE — TELEPHONE ENCOUNTER
S/w Dustin Yan from [facility] and she states she s/w pt and informed him that they still can't find agency for him. She states she will continue to look for one, but wanted to know if pt was open to doing  Outpt PT if he could get transport.  He told he would s/w his

## 2020-10-27 NOTE — TELEPHONE ENCOUNTER
Per spouse pt has not received any information about physical therapy after surgery, requesting to speak to RN. Please call thank you.

## 2020-10-30 ENCOUNTER — TELEPHONE (OUTPATIENT)
Dept: ORTHOPEDICS CLINIC | Facility: CLINIC | Age: 45
End: 2020-10-30

## 2020-10-30 ENCOUNTER — OFFICE VISIT (OUTPATIENT)
Dept: PHYSICAL THERAPY | Age: 45
End: 2020-10-30
Attending: ORTHOPAEDIC SURGERY
Payer: COMMERCIAL

## 2020-10-30 DIAGNOSIS — G89.29 CHRONIC PAIN OF RIGHT KNEE: ICD-10-CM

## 2020-10-30 DIAGNOSIS — Z47.89 ORTHOPEDIC AFTERCARE: Primary | ICD-10-CM

## 2020-10-30 DIAGNOSIS — M17.31 POST-TRAUMATIC OSTEOARTHRITIS OF RIGHT KNEE: ICD-10-CM

## 2020-10-30 DIAGNOSIS — M25.561 CHRONIC PAIN OF RIGHT KNEE: ICD-10-CM

## 2020-10-30 PROCEDURE — 97162 PT EVAL MOD COMPLEX 30 MIN: CPT

## 2020-10-30 PROCEDURE — 97110 THERAPEUTIC EXERCISES: CPT

## 2020-10-30 PROCEDURE — 99024 POSTOP FOLLOW-UP VISIT: CPT | Performed by: ORTHOPAEDIC SURGERY

## 2020-10-30 RX ORDER — HYDROCODONE BITARTRATE AND ACETAMINOPHEN 10; 325 MG/1; MG/1
1 TABLET ORAL EVERY 6 HOURS PRN
Qty: 25 TABLET | Refills: 0 | Status: SHIPPED | OUTPATIENT
Start: 2020-10-30 | End: 2020-12-29 | Stop reason: ALTCHOICE

## 2020-10-30 NOTE — TELEPHONE ENCOUNTER
Called pt's wife phone # and it went to . LM with Severo's instructions. Called pt's cell # and spoke to pt. Informed pt of Severo's instructions. Advised pt to go to ED if he develops any type of calf pain, chest pain or SOB.  Pt aware that Diana Ericka was

## 2020-10-30 NOTE — PROGRESS NOTES
INITIAL EVALUATION:   Referring Physician: Dr. Isabell Smart  Diagnosis: Post-traumatic osteoarthritis of right knee (M17.31)  Chronic pain of right knee (M25.561,G89.29)       Date of Service: 10/30/2020     PATIENT SUMMARY/ASSESSMENT   Kasia Grimaldo i level of function as independent with ADLs and self-care tasks, but with increased pain and difficulty. Pt goals include decreasing his complaints of pain and returning to previous level of activity. Past medical history was reviewed with Beatriz Bose.  Fide deviations or complaints of pain  · Patient to ascend/descend at least 12 steps with reciprocal pattern     Frequency / Duration: Patient will be seen for 2 x/week or a total of 16 visits over a 90 day period. Treatment will include: Manual Therapy;  Jack Wiley

## 2020-10-30 NOTE — TELEPHONE ENCOUNTER
Dr Guy Cooper please advise on this PO pt of Mandy:    Sx on 10/22/20 for right TKA    Spoke to wife of pt and she has the following concerns:    1) Requesting refill of Norco 10/325 mg -- pt is out of this medication. Rates pain 5-6/10.  Taking naproxen an

## 2020-10-30 NOTE — TELEPHONE ENCOUNTER
Patients wife/Hortencia calling for patient requesting  rx for pain on legs, also indicates patient was able to get outpatient physical therapy appointment, first appointment is today and has follow up Monday. Please call at:[phone] thanks.   *Patients wife

## 2020-10-30 NOTE — TELEPHONE ENCOUNTER
Recommend ice/elevation for swelling. If patient develops calf pain, chest pain or shortness of breath, should go to ED for evaluation.

## 2020-11-02 ENCOUNTER — TELEPHONE (OUTPATIENT)
Dept: PHYSICAL THERAPY | Age: 45
End: 2020-11-02

## 2020-11-02 ENCOUNTER — APPOINTMENT (OUTPATIENT)
Dept: PHYSICAL THERAPY | Age: 45
End: 2020-11-02
Attending: ORTHOPAEDIC SURGERY
Payer: COMMERCIAL

## 2020-11-02 RX ORDER — FLUOXETINE HYDROCHLORIDE 20 MG/1
CAPSULE ORAL
Qty: 180 CAPSULE | Refills: 1 | Status: SHIPPED | OUTPATIENT
Start: 2020-11-02 | End: 2020-12-29 | Stop reason: ALTCHOICE

## 2020-11-03 ENCOUNTER — HOSPITAL ENCOUNTER (OUTPATIENT)
Dept: GENERAL RADIOLOGY | Facility: HOSPITAL | Age: 45
Discharge: HOME | End: 2020-11-03
Attending: ORTHOPAEDIC SURGERY
Payer: COMMERCIAL

## 2020-11-03 ENCOUNTER — OFFICE VISIT (OUTPATIENT)
Dept: ORTHOPEDICS CLINIC | Facility: CLINIC | Age: 45
End: 2020-11-03

## 2020-11-03 DIAGNOSIS — Z47.89 ORTHOPEDIC AFTERCARE: ICD-10-CM

## 2020-11-03 DIAGNOSIS — Z96.651 S/P TOTAL KNEE REPLACEMENT USING CEMENT, RIGHT: ICD-10-CM

## 2020-11-03 DIAGNOSIS — M17.31 POST-TRAUMATIC OSTEOARTHRITIS OF RIGHT KNEE: Primary | ICD-10-CM

## 2020-11-03 PROCEDURE — 99024 POSTOP FOLLOW-UP VISIT: CPT | Performed by: ORTHOPAEDIC SURGERY

## 2020-11-03 PROCEDURE — 1111F DSCHRG MED/CURRENT MED MERGE: CPT | Performed by: ORTHOPAEDIC SURGERY

## 2020-11-03 PROCEDURE — 73562 X-RAY EXAM OF KNEE 3: CPT | Performed by: ORTHOPAEDIC SURGERY

## 2020-11-03 NOTE — PROGRESS NOTES
NURSING INTAKE COMMENTS: Patient presents with:  Post-Op: Right TKA - 1st visit - had sx on 10/22/2020 - states he is good - has pain rated as 2-8/10 on and off       HPI: This 54year old male presents today almost 2 weeks after right total knee arthropla Take 1 tablet (4 mg total) by mouth every 8 (eight) hours as needed for Nausea. 20 tablet 0   • Calcium Citrate-Vitamin D 315-250 MG-UNIT Oral Tab Take 1 tablet by mouth 2 (two) times daily with meals.  60 tablet 0   • Multiple Vitamins-Minerals (Devante Armstrong Grandfather    • Depression Neg    • Bipolar Disorder Neg    • Schizophrenia Neg    • ADHD Neg    • Suicide History Neg      No family Hx of DVT/PE    Social History    Occupational History      Not on file    Tobacco Use      Smoking status: Former Smoker The wound was healed well. Musculoskeletal: Motion was 5 to 95 degrees. There was no instability. Neurological: Normal motor and sensory right foot and toes.     Imaging: X-rays were shown to him and his wife show the components well fixed and in proper 11/03/2020 at 11:58 AM             Lab Results   Component Value Date    WBC 7.4 10/01/2020    HGB 12.5 (L) 10/23/2020    .0 10/01/2020      Lab Results   Component Value Date     (H) 10/01/2020    BUN 11 10/01/2020    CREATSERUM 0.84 10/01/2

## 2020-11-05 ENCOUNTER — OFFICE VISIT (OUTPATIENT)
Dept: PHYSICAL THERAPY | Age: 45
End: 2020-11-05
Attending: ORTHOPAEDIC SURGERY
Payer: COMMERCIAL

## 2020-11-05 PROCEDURE — 97110 THERAPEUTIC EXERCISES: CPT

## 2020-11-05 NOTE — PROGRESS NOTES
Dx: Post-traumatic osteoarthritis of right knee (M17.31)  Chronic pain of right knee (M25.561,G89.29)  Right TKR        Authorized # of Visits:  12  Fall Risk: standard         Precautions: n/a             Subjective:    The patient reports his knee is feel

## 2020-11-06 ENCOUNTER — APPOINTMENT (OUTPATIENT)
Dept: PHYSICAL THERAPY | Age: 45
End: 2020-11-06
Attending: OPHTHALMOLOGY
Payer: COMMERCIAL

## 2020-11-09 ENCOUNTER — APPOINTMENT (OUTPATIENT)
Dept: PHYSICAL THERAPY | Age: 45
End: 2020-11-09
Attending: ORTHOPAEDIC SURGERY
Payer: COMMERCIAL

## 2020-11-10 ENCOUNTER — APPOINTMENT (OUTPATIENT)
Dept: PHYSICAL THERAPY | Age: 45
End: 2020-11-10
Attending: OPHTHALMOLOGY
Payer: COMMERCIAL

## 2020-11-10 ENCOUNTER — OFFICE VISIT (OUTPATIENT)
Dept: PHYSICAL THERAPY | Age: 45
End: 2020-11-10
Attending: ORTHOPAEDIC SURGERY
Payer: COMMERCIAL

## 2020-11-10 PROCEDURE — 97110 THERAPEUTIC EXERCISES: CPT

## 2020-11-10 PROCEDURE — 97140 MANUAL THERAPY 1/> REGIONS: CPT

## 2020-11-10 NOTE — PROGRESS NOTES
Dx: Post-traumatic osteoarthritis of right knee (M17.31)  Chronic pain of right knee (M25.561,G89.29) 10/22/2020  Right TKR        Authorized # of Visits:  12  Fall Risk: standard         Precautions: n/a             Subjective: Knee has been very swollen

## 2020-11-12 ENCOUNTER — APPOINTMENT (OUTPATIENT)
Dept: PHYSICAL THERAPY | Age: 45
End: 2020-11-12
Attending: ORTHOPAEDIC SURGERY
Payer: COMMERCIAL

## 2020-11-13 ENCOUNTER — APPOINTMENT (OUTPATIENT)
Dept: PHYSICAL THERAPY | Age: 45
End: 2020-11-13
Attending: OPHTHALMOLOGY
Payer: COMMERCIAL

## 2020-11-13 ENCOUNTER — OFFICE VISIT (OUTPATIENT)
Dept: PHYSICAL THERAPY | Age: 45
End: 2020-11-13
Attending: ORTHOPAEDIC SURGERY
Payer: COMMERCIAL

## 2020-11-13 PROCEDURE — 97140 MANUAL THERAPY 1/> REGIONS: CPT

## 2020-11-13 PROCEDURE — 97110 THERAPEUTIC EXERCISES: CPT

## 2020-11-13 NOTE — PROGRESS NOTES
Dx: Post-traumatic osteoarthritis of right knee (M17.31)  Chronic pain of right knee (M25.561,G89.29) 10/22/2020  Right TKR        Authorized # of Visits:  12  Fall Risk: standard         Precautions: n/a             Subjective: Felt great yesterday, much TherEx x 1, man therapy 2      Total Timed Treatment: 45 min  Total Treatment Time: 45 min

## 2020-11-16 ENCOUNTER — APPOINTMENT (OUTPATIENT)
Dept: PHYSICAL THERAPY | Age: 45
End: 2020-11-16
Attending: ORTHOPAEDIC SURGERY
Payer: COMMERCIAL

## 2020-11-17 ENCOUNTER — OFFICE VISIT (OUTPATIENT)
Dept: PHYSICAL THERAPY | Age: 45
End: 2020-11-17
Attending: ORTHOPAEDIC SURGERY
Payer: COMMERCIAL

## 2020-11-17 ENCOUNTER — APPOINTMENT (OUTPATIENT)
Dept: PHYSICAL THERAPY | Age: 45
End: 2020-11-17
Attending: OPHTHALMOLOGY
Payer: COMMERCIAL

## 2020-11-17 PROCEDURE — 97110 THERAPEUTIC EXERCISES: CPT

## 2020-11-17 PROCEDURE — 97140 MANUAL THERAPY 1/> REGIONS: CPT

## 2020-11-17 NOTE — PROGRESS NOTES
Dx: Post-traumatic osteoarthritis of right knee (M17.31)  Chronic pain of right knee (M25.561,G89.29) 10/22/2020  Right TKR        Authorized # of Visits:  12  Fall Risk: standard         Precautions: n/a             Subjective: Feeling much better, knee f Standing HS stretch 3 x 30 sec         Standing knee flexion stretch with foot on step         Standing gastroc stretch 3 x 30 sec         Attempted shuttle Bilateral LE with 2 bands, but patient reported too much pain- discontinued exercise         Rev

## 2020-11-19 ENCOUNTER — APPOINTMENT (OUTPATIENT)
Dept: PHYSICAL THERAPY | Age: 45
End: 2020-11-19
Attending: ORTHOPAEDIC SURGERY
Payer: COMMERCIAL

## 2020-11-20 ENCOUNTER — OFFICE VISIT (OUTPATIENT)
Dept: PHYSICAL THERAPY | Age: 45
End: 2020-11-20
Attending: ORTHOPAEDIC SURGERY
Payer: COMMERCIAL

## 2020-11-20 ENCOUNTER — APPOINTMENT (OUTPATIENT)
Dept: PHYSICAL THERAPY | Age: 45
End: 2020-11-20
Attending: OPHTHALMOLOGY
Payer: COMMERCIAL

## 2020-11-20 PROCEDURE — 97110 THERAPEUTIC EXERCISES: CPT

## 2020-11-20 PROCEDURE — 97140 MANUAL THERAPY 1/> REGIONS: CPT

## 2020-11-20 NOTE — PROGRESS NOTES
Dx: Post-traumatic osteoarthritis of right knee (M17.31)  Chronic pain of right knee (M25.561,G89.29) 10/22/2020  Right TKR        Authorized # of Visits:  12  Fall Risk: standard         Precautions: n/a             Subjective: welling going down, but kne 10 with 5 sec holds       Seated HS stretch       Standing HS stretch 3 x 30 sec       Standing knee flexion stretch with foot on step       Standing gastroc stretch 3 x 30 sec       Attempted shuttle Bilateral LE with 2 bands, but patient reported too muc

## 2020-11-23 ENCOUNTER — APPOINTMENT (OUTPATIENT)
Dept: PHYSICAL THERAPY | Age: 45
End: 2020-11-23
Attending: OPHTHALMOLOGY
Payer: COMMERCIAL

## 2020-11-23 ENCOUNTER — APPOINTMENT (OUTPATIENT)
Dept: PHYSICAL THERAPY | Age: 45
End: 2020-11-23
Attending: ORTHOPAEDIC SURGERY
Payer: COMMERCIAL

## 2020-11-24 ENCOUNTER — OFFICE VISIT (OUTPATIENT)
Dept: PHYSICAL THERAPY | Age: 45
End: 2020-11-24
Attending: ORTHOPAEDIC SURGERY
Payer: COMMERCIAL

## 2020-11-24 ENCOUNTER — APPOINTMENT (OUTPATIENT)
Dept: PHYSICAL THERAPY | Age: 45
End: 2020-11-24
Payer: COMMERCIAL

## 2020-11-24 ENCOUNTER — TELEPHONE (OUTPATIENT)
Dept: ORTHOPEDICS CLINIC | Facility: CLINIC | Age: 45
End: 2020-11-24

## 2020-11-24 PROCEDURE — 97110 THERAPEUTIC EXERCISES: CPT

## 2020-11-24 PROCEDURE — 97140 MANUAL THERAPY 1/> REGIONS: CPT

## 2020-11-24 RX ORDER — NAPROXEN 500 MG/1
500 TABLET ORAL 2 TIMES DAILY WITH MEALS
Qty: 60 TABLET | Refills: 0 | Status: SHIPPED | OUTPATIENT
Start: 2020-11-24 | End: 2021-02-15

## 2020-11-24 RX ORDER — NAPROXEN 500 MG/1
500 TABLET ORAL 2 TIMES DAILY WITH MEALS
Qty: 60 TABLET | Refills: 0 | Status: SHIPPED | OUTPATIENT
Start: 2020-11-24 | End: 2020-12-29 | Stop reason: ALTCHOICE

## 2020-11-24 NOTE — PROGRESS NOTES
Dx: Post-traumatic osteoarthritis of right knee (M17.31)  Chronic pain of right knee (M25.561,G89.29) 10/22/2020  Right TKR        Authorized # of Visits:  12  Fall Risk: standard         Precautions: n/a             Subjective: Swelling continues to get b 8'  Patella mobs 4' all planes MANUAL THER  STM to knee for edema control 6', STM R ITB 6'  Patella mobs 4' all planes MANUAL THER  STM to knee for edema control 6', STM R ITB 6'  Patella mobs 4' all planes MANUAL THER  STM to knee for edema control 6', ST

## 2020-11-24 NOTE — TELEPHONE ENCOUNTER
rc'd fax request from [facility] for refill request on Naproxen 500mg. LOV 11/3/20  Last rx given 10/22/20 #60, no refill  Jason Bias do you approve?

## 2020-11-25 ENCOUNTER — APPOINTMENT (OUTPATIENT)
Dept: PHYSICAL THERAPY | Age: 45
End: 2020-11-25
Attending: OPHTHALMOLOGY
Payer: COMMERCIAL

## 2020-11-25 ENCOUNTER — APPOINTMENT (OUTPATIENT)
Dept: PHYSICAL THERAPY | Age: 45
End: 2020-11-25
Attending: ORTHOPAEDIC SURGERY
Payer: COMMERCIAL

## 2020-11-25 ENCOUNTER — TELEPHONE (OUTPATIENT)
Dept: PHYSICAL THERAPY | Age: 45
End: 2020-11-25

## 2020-11-30 ENCOUNTER — APPOINTMENT (OUTPATIENT)
Dept: PHYSICAL THERAPY | Age: 45
End: 2020-11-30
Attending: ORTHOPAEDIC SURGERY
Payer: COMMERCIAL

## 2020-11-30 ENCOUNTER — OFFICE VISIT (OUTPATIENT)
Dept: ORTHOPEDICS CLINIC | Facility: CLINIC | Age: 45
End: 2020-11-30

## 2020-11-30 ENCOUNTER — HOSPITAL ENCOUNTER (OUTPATIENT)
Dept: GENERAL RADIOLOGY | Facility: HOSPITAL | Age: 45
Discharge: HOME | End: 2020-11-30
Attending: ORTHOPAEDIC SURGERY
Payer: COMMERCIAL

## 2020-11-30 VITALS — BODY MASS INDEX: 32.58 KG/M2 | WEIGHT: 220 LBS | HEIGHT: 69 IN

## 2020-11-30 DIAGNOSIS — Z47.89 ORTHOPEDIC AFTERCARE: ICD-10-CM

## 2020-11-30 DIAGNOSIS — Z96.651 S/P TOTAL KNEE REPLACEMENT USING CEMENT, RIGHT: ICD-10-CM

## 2020-11-30 DIAGNOSIS — M17.31 POST-TRAUMATIC OSTEOARTHRITIS OF RIGHT KNEE: Primary | ICD-10-CM

## 2020-11-30 PROCEDURE — 73562 X-RAY EXAM OF KNEE 3: CPT | Performed by: ORTHOPAEDIC SURGERY

## 2020-11-30 PROCEDURE — 3008F BODY MASS INDEX DOCD: CPT | Performed by: ORTHOPAEDIC SURGERY

## 2020-11-30 PROCEDURE — 99024 POSTOP FOLLOW-UP VISIT: CPT | Performed by: ORTHOPAEDIC SURGERY

## 2020-11-30 RX ORDER — HYDROCODONE BITARTRATE AND ACETAMINOPHEN 10; 325 MG/1; MG/1
1 TABLET ORAL EVERY 8 HOURS PRN
Qty: 20 TABLET | Refills: 0 | Status: SHIPPED | OUTPATIENT
Start: 2020-11-30 | End: 2021-06-10

## 2020-12-01 ENCOUNTER — APPOINTMENT (OUTPATIENT)
Dept: PHYSICAL THERAPY | Age: 45
End: 2020-12-01
Attending: OPHTHALMOLOGY
Payer: COMMERCIAL

## 2020-12-01 ENCOUNTER — OFFICE VISIT (OUTPATIENT)
Dept: PHYSICAL THERAPY | Age: 45
End: 2020-12-01
Attending: ORTHOPAEDIC SURGERY
Payer: COMMERCIAL

## 2020-12-01 PROCEDURE — 97110 THERAPEUTIC EXERCISES: CPT

## 2020-12-01 PROCEDURE — 97140 MANUAL THERAPY 1/> REGIONS: CPT

## 2020-12-01 NOTE — PROGRESS NOTES
Dx: Post-traumatic osteoarthritis of right knee (M17.31)  Chronic pain of right knee (M25.561,G89.29) 10/22/2020  Right TKR        Authorized # of Visits:  12  Fall Risk: standard         Precautions: n/a             Subjective: Saw MD yesterday, disappoin stretch 3x30\"  Hamstring stretch 3x30\"  Passive ext stretch 4'  Knee PROM flexion 8'  SAQ A 3x10  Prone ham curl 2x10  Shuttle 62 3x10   SLR 2 x 10 MANUAL THER  STM to knee for edema control 12'  Patella mobs 4' all planes MANUAL THER  STM to knee for ed

## 2020-12-01 NOTE — PROGRESS NOTES
NURSING INTAKE COMMENTS: Patient presents with:  Post-Op: 2nd post op ,TKA ,pain can reach a 10/10 at times       HPI: This 54year old male presents today after right total knee arthroplasty 10/22/2020.   Patient comes the office today accompanied by his w Take 1 tablet (500 mg total) by mouth 2 (two) times daily with meals. Take with food. Stop if stomach upset. 60 tablet 0   • Prazosin HCl 2 MG Oral Cap Take 3 capsules (6 mg total) by mouth nightly.  69 capsule 0   • FLUOXETINE HCL 20 MG Oral Cap TAKE 1 CAP ITCHING  Family History   Problem Relation Age of Onset   • Cancer Father         colon   • Diabetes Father    • Heart Disorder Father         CABG, stents   • Hypertension Father    • Psychiatric Father    • Alcohol and Other Disorders Associated Father urinating, no incontinence  MUSCULOSKELETAL: no other musculoskeletal complaints other than in HPI  NEURO: no numbness or tingling, no weakness or balance disorder  PSYCHE: no depression or anxiety  HEMATOLOGIC: no hx of blood dyscrasia, no Hx DVT/PE  ENDO (CST): Alycia Briceño MD on 11/30/2020 at 7:39 PM          Xr Knee Routine (3 Views), Right (cpt=73562)    Result Date: 11/3/2020  PROCEDURE: XR KNEE ROUTINE (3 VIEWS), RIGHT (WPL=69126)  COMPARISON: [facility], XR KNEE (1 OR 2 VIEWS), RIGHT therapy. I prescribed patient Norco 10/325.  20 tablets were given. I advised patient this would be his last narcotic pain prescription. I refilled his naproxen. He denies any stomach upset or any history of any kidney disease.   He will follow-up in th

## 2020-12-03 ENCOUNTER — APPOINTMENT (OUTPATIENT)
Dept: PHYSICAL THERAPY | Age: 45
End: 2020-12-03
Attending: ORTHOPAEDIC SURGERY
Payer: COMMERCIAL

## 2020-12-04 ENCOUNTER — OFFICE VISIT (OUTPATIENT)
Dept: PHYSICAL THERAPY | Age: 45
End: 2020-12-04
Attending: ORTHOPAEDIC SURGERY
Payer: COMMERCIAL

## 2020-12-04 ENCOUNTER — TELEPHONE (OUTPATIENT)
Dept: ORTHOPEDICS CLINIC | Facility: CLINIC | Age: 45
End: 2020-12-04

## 2020-12-04 DIAGNOSIS — Z47.89 ORTHOPEDIC AFTERCARE: Primary | ICD-10-CM

## 2020-12-04 DIAGNOSIS — M17.31 POST-TRAUMATIC OSTEOARTHRITIS OF RIGHT KNEE: ICD-10-CM

## 2020-12-04 PROCEDURE — 97110 THERAPEUTIC EXERCISES: CPT

## 2020-12-04 PROCEDURE — 97140 MANUAL THERAPY 1/> REGIONS: CPT

## 2020-12-04 NOTE — PROGRESS NOTES
Dx: Post-traumatic osteoarthritis of right knee (M17.31)  Chronic pain of right knee (M25.561,G89.29) 10/22/2020  Right TKR        Authorized # of Visits:  12  Fall Risk: standard         Precautions: n/a             Subjective: Knee moving much better, sl planes MANUAL THER  STM to knee for edema control 6', STM R ITB 6'  Patella mobs 4' all planes MANUAL THER  STM to knee for edema control 6', STM R ITB 6'  Patella mobs 4' all planes MANUAL THER  STM to knee for edema control 6', STM R ITB 6'  Patella mobs

## 2020-12-07 ENCOUNTER — APPOINTMENT (OUTPATIENT)
Dept: PHYSICAL THERAPY | Age: 45
End: 2020-12-07
Attending: ORTHOPAEDIC SURGERY
Payer: COMMERCIAL

## 2020-12-08 ENCOUNTER — OFFICE VISIT (OUTPATIENT)
Dept: PHYSICAL THERAPY | Age: 45
End: 2020-12-08
Attending: ORTHOPAEDIC SURGERY
Payer: COMMERCIAL

## 2020-12-08 PROCEDURE — 97140 MANUAL THERAPY 1/> REGIONS: CPT

## 2020-12-08 PROCEDURE — 97110 THERAPEUTIC EXERCISES: CPT

## 2020-12-08 NOTE — PROGRESS NOTES
Dx: Post-traumatic osteoarthritis of right knee (M17.31)  Chronic pain of right knee (M25.561,G89.29) 10/22/2020  Right TKR        Authorized # of Visits:  12  Fall Risk: standard         Precautions: n/a             Subjective: Drove for 10hrs with no charissa to knee for edema control 6', STM R ITB 6'  Patella mobs 4' all planes MANUAL THER  STM to knee for edema control 6', STM R ITB 6'  Patella mobs 4' all planes MANUAL THER  STM to knee for edema control 6', STM R ITB 6'  Patella mobs 4' all planes MAN THERA

## 2020-12-10 ENCOUNTER — APPOINTMENT (OUTPATIENT)
Dept: PHYSICAL THERAPY | Age: 45
End: 2020-12-10
Attending: ORTHOPAEDIC SURGERY
Payer: COMMERCIAL

## 2020-12-11 ENCOUNTER — OFFICE VISIT (OUTPATIENT)
Dept: PHYSICAL THERAPY | Age: 45
End: 2020-12-11
Attending: ORTHOPAEDIC SURGERY
Payer: COMMERCIAL

## 2020-12-11 PROCEDURE — 97140 MANUAL THERAPY 1/> REGIONS: CPT

## 2020-12-11 PROCEDURE — 97110 THERAPEUTIC EXERCISES: CPT

## 2020-12-11 NOTE — PROGRESS NOTES
Dx: Post-traumatic osteoarthritis of right knee (M17.31)  Chronic pain of right knee (M25.561,G89.29) 10/22/2020  Right TKR        Authorized # of Visits:  12  Fall Risk: standard         Precautions: n/a             Subjective: Happy with ROM progress and stretch 3x30\"  Hamstring stretch 3x30\"  Passive ext stretch 4'  Knee PROM flexion 8'  SAQ A 3x10  Prone ham curl 2x10  Shuttle 75 3x10  Lateral stepping 20'x4 Nu-step L4 6'  Gastroc stretch 3x30\"  Hamstring stretch 3x30\"  Passive ext stretch 4'  Knee P

## 2020-12-17 NOTE — TELEPHONE ENCOUNTER
As discussed via telephone, most of your lab results are normal.  Your white blood cell count normal but has been consistently high in the past.  We will order a test to evaluate this call peripheral smear. Please schedule a lab appointment at our office to complete this. Your sodium level was slightly lower than normal.  This is likely a result of your diuretic blood pressure medication. Please increase your water intake to 2 L daily. Your diabetes is much improved with A1c at goal of less than 7.5. Keep up the good work.  Mychart result comment sent New referral placed in chart.   Also remind patient that he is due for his annual exam, recommend to set up as soon as possible

## 2020-12-29 ENCOUNTER — HOSPITAL ENCOUNTER (OUTPATIENT)
Dept: GENERAL RADIOLOGY | Facility: HOSPITAL | Age: 45
Discharge: HOME | End: 2020-12-29
Attending: ORTHOPAEDIC SURGERY
Payer: COMMERCIAL

## 2020-12-29 ENCOUNTER — OFFICE VISIT (OUTPATIENT)
Dept: ORTHOPEDICS CLINIC | Facility: CLINIC | Age: 45
End: 2020-12-29

## 2020-12-29 VITALS — BODY MASS INDEX: 35.55 KG/M2 | HEIGHT: 69 IN | WEIGHT: 240 LBS

## 2020-12-29 DIAGNOSIS — Z47.89 ORTHOPEDIC AFTERCARE: ICD-10-CM

## 2020-12-29 DIAGNOSIS — Z96.651 S/P TOTAL KNEE REPLACEMENT USING CEMENT, RIGHT: ICD-10-CM

## 2020-12-29 DIAGNOSIS — M17.31 POST-TRAUMATIC OSTEOARTHRITIS OF RIGHT KNEE: Primary | ICD-10-CM

## 2020-12-29 DIAGNOSIS — E78.00 HYPERCHOLESTEROLEMIA: ICD-10-CM

## 2020-12-29 PROCEDURE — 73562 X-RAY EXAM OF KNEE 3: CPT | Performed by: ORTHOPAEDIC SURGERY

## 2020-12-29 PROCEDURE — 3008F BODY MASS INDEX DOCD: CPT | Performed by: ORTHOPAEDIC SURGERY

## 2020-12-29 PROCEDURE — 99024 POSTOP FOLLOW-UP VISIT: CPT | Performed by: ORTHOPAEDIC SURGERY

## 2020-12-29 RX ORDER — ATORVASTATIN CALCIUM 10 MG/1
TABLET, FILM COATED ORAL
Qty: 30 TABLET | Refills: 2 | Status: SHIPPED | OUTPATIENT
Start: 2020-12-29 | End: 2021-04-12

## 2020-12-29 NOTE — PROGRESS NOTES
NURSING INTAKE COMMENTS: Patient presents with:  Post-Op: 3rd ,right knee total replacement ,patient has a mild pop and  mild discomfort. ,patient completed PT      HPI: This 54year old male presents today months after right knee replacement.   His motion Take 1 capsule (60 mg total) by mouth every morning. • atorvastatin 10 MG Oral Tab Take 1 tablet (10 mg total) by mouth nightly. 30 tablet 2   • Cholecalciferol (VITAMIN D3) 250 MCG (54289 UT) Oral Cap Take 10,000 Units by mouth daily.      • cetirizine History    Occupational History      Not on file    Tobacco Use      Smoking status: Former Smoker        Packs/day: 2.00        Types: Cigarettes        Quit date:         Years since quittin.0      Smokeless tobacco: Never Used      Tobacco comm Motion was excellent from 0 to 130 degrees. There was no instability and the patella was tracking well. The clicking was normal.  Neurological: Normal motor and sensory right lower extremity. He had no calf atrophy at this point.     Imaging: X-rays of t right        Assessment: Right knee replacement 2 months out doing extremely well. Plan: He will advance activities and exercise predominantly in the nonimpact category. He is doing so well I told him he can see for his 1 year anniversary x-ray.   He wi

## 2021-01-13 PROBLEM — F43.10 POST TRAUMATIC STRESS DISORDER: Chronic | Status: ACTIVE | Noted: 2020-09-03

## 2021-02-15 RX ORDER — NAPROXEN 500 MG/1
TABLET ORAL
Qty: 60 TABLET | Refills: 0 | Status: SHIPPED | OUTPATIENT
Start: 2021-02-15 | End: 2021-06-10

## 2021-04-07 DIAGNOSIS — E78.00 HYPERCHOLESTEROLEMIA: ICD-10-CM

## 2021-04-12 NOTE — TELEPHONE ENCOUNTER
Future appt:     Your appointments     Date & Time Appointment Department [facility])    Apr 15, 2021  9:00 AM CDT Follow Up Visit with Gurwinder Null MD [address] [facility], [facility] ([facility])        Oct 18, 2021 10:00 AM

## 2021-04-13 RX ORDER — ATORVASTATIN CALCIUM 10 MG/1
TABLET, FILM COATED ORAL
Qty: 30 TABLET | Refills: 0 | Status: SHIPPED | OUTPATIENT
Start: 2021-04-13 | End: 2021-04-15

## 2021-04-15 ENCOUNTER — OFFICE VISIT (OUTPATIENT)
Dept: FAMILY MEDICINE CLINIC | Facility: CLINIC | Age: 46
End: 2021-04-15

## 2021-04-15 VITALS
HEART RATE: 76 BPM | SYSTOLIC BLOOD PRESSURE: 106 MMHG | RESPIRATION RATE: 16 BRPM | TEMPERATURE: 100 F | BODY MASS INDEX: 33.47 KG/M2 | HEIGHT: 69 IN | OXYGEN SATURATION: 95 % | DIASTOLIC BLOOD PRESSURE: 68 MMHG | WEIGHT: 226 LBS

## 2021-04-15 DIAGNOSIS — Z00.00 PHYSICAL EXAM: Primary | ICD-10-CM

## 2021-04-15 DIAGNOSIS — E78.00 HYPERCHOLESTEROLEMIA: ICD-10-CM

## 2021-04-15 PROCEDURE — 3078F DIAST BP <80 MM HG: CPT | Performed by: FAMILY MEDICINE

## 2021-04-15 PROCEDURE — 3008F BODY MASS INDEX DOCD: CPT | Performed by: FAMILY MEDICINE

## 2021-04-15 PROCEDURE — 99396 PREV VISIT EST AGE 40-64: CPT | Performed by: FAMILY MEDICINE

## 2021-04-15 PROCEDURE — 3074F SYST BP LT 130 MM HG: CPT | Performed by: FAMILY MEDICINE

## 2021-04-15 RX ORDER — ATORVASTATIN CALCIUM 10 MG/1
10 TABLET, FILM COATED ORAL NIGHTLY
Qty: 90 TABLET | Refills: 1 | Status: SHIPPED | OUTPATIENT
Start: 2021-04-15 | End: 2021-05-21

## 2021-04-15 NOTE — PATIENT INSTRUCTIONS
Continue current medications  Blood pressure slightly low. Recommend to cut back on prazosin. Monitor for low blood pressure. Schedule fasting labs. Advice low cholesterol diet and exercise. May use fish oil supplement.

## 2021-04-15 NOTE — PROGRESS NOTES
[address]    Chief Complaint:   Patient presents with:  Medication Follow-Up  Physical      HPI:   Tee Virgen is a 54year old male who presents for an Annual Health Visit.    Patient with history of hypercholesterolemia, cu Essential hypertension 10/22/2020   • Fibromyalgia 07/30/2020   • Osteoarthritis    • Sleep apnea     hx of sleep study does not have a C-Pap   • Visual impairment     glasses      Past Surgical History:   Procedure Laterality Date   • KNEE TOTAL REPLACEME REVIEW OF SYSTEMS:   GENERAL HEALTH: feels well otherwise   SKIN: denies any unusual skin lesions or rashes  EYES: no visual complaints or deficits  HEENT: denies nasal congestion, sinus pain or sore throat; hearing loss negative  RESPIRATORY: denies s Declined  LYMPHATIC: no lymphadenopathy  MUSCULOSKELETAL: no acute synovitis upper or lower extremity  EXTREMITIES: no cyanosis, clubbing or edema, peripheral pulses intact  NEUROLOGIC: Cranial nerves II through XII grossly intact; reflexes normal  PSYCHIA created utilizing Dragon speech recognition software.  Please excuse any grammatical errors. Call my office if you have any questions regarding this note.

## 2021-04-21 ENCOUNTER — LABORATORY ENCOUNTER (OUTPATIENT)
Dept: LAB | Age: 46
End: 2021-04-21
Attending: FAMILY MEDICINE
Payer: COMMERCIAL

## 2021-04-21 DIAGNOSIS — Z00.00 PHYSICAL EXAM: ICD-10-CM

## 2021-04-21 PROCEDURE — 84443 ASSAY THYROID STIM HORMONE: CPT

## 2021-04-21 PROCEDURE — 80053 COMPREHEN METABOLIC PANEL: CPT

## 2021-04-21 PROCEDURE — 83036 HEMOGLOBIN GLYCOSYLATED A1C: CPT

## 2021-04-21 PROCEDURE — 36415 COLL VENOUS BLD VENIPUNCTURE: CPT

## 2021-04-21 PROCEDURE — 80061 LIPID PANEL: CPT

## 2021-04-21 PROCEDURE — 85025 COMPLETE CBC W/AUTO DIFF WBC: CPT

## 2021-04-23 ENCOUNTER — LAB ENCOUNTER (OUTPATIENT)
Dept: LAB | Age: 46
End: 2021-04-23
Attending: FAMILY MEDICINE
Payer: COMMERCIAL

## 2021-04-23 DIAGNOSIS — Z00.00 PHYSICAL EXAM: ICD-10-CM

## 2021-04-23 PROCEDURE — 81003 URINALYSIS AUTO W/O SCOPE: CPT

## 2021-05-20 DIAGNOSIS — E78.00 HYPERCHOLESTEROLEMIA: ICD-10-CM

## 2021-05-21 RX ORDER — ATORVASTATIN CALCIUM 10 MG/1
10 TABLET, FILM COATED ORAL NIGHTLY
Qty: 90 TABLET | Refills: 1 | Status: SHIPPED | OUTPATIENT
Start: 2021-05-21

## 2021-05-21 NOTE — TELEPHONE ENCOUNTER
Future appt:     Your appointments     Date & Time Appointment Department [facility])    Jun 09, 2021  4:00 PM CDT Video Visit with Matt Patel, [address] ([address])    Please verify your telehealth insurance benefits prior to Date Value   04/21/2021 40     LDL Cholesterol (mg/dL)   Date Value   04/21/2021 103 (H)     Triglycerides (mg/dL)   Date Value   04/21/2021 140     Lab Results   Component Value Date     04/21/2021    A1C 5.7 (H) 04/21/2021     Lab Results   Comp

## 2021-06-10 ENCOUNTER — OFFICE VISIT (OUTPATIENT)
Dept: FAMILY MEDICINE CLINIC | Facility: CLINIC | Age: 46
End: 2021-06-10

## 2021-06-10 VITALS
TEMPERATURE: 97 F | WEIGHT: 219.81 LBS | HEART RATE: 95 BPM | RESPIRATION RATE: 16 BRPM | OXYGEN SATURATION: 95 % | DIASTOLIC BLOOD PRESSURE: 86 MMHG | BODY MASS INDEX: 32.56 KG/M2 | HEIGHT: 69 IN | SYSTOLIC BLOOD PRESSURE: 130 MMHG

## 2021-06-10 DIAGNOSIS — R73.9 HYPERGLYCEMIA: ICD-10-CM

## 2021-06-10 DIAGNOSIS — T14.8XXA OPEN WOUND: Primary | ICD-10-CM

## 2021-06-10 PROCEDURE — 3008F BODY MASS INDEX DOCD: CPT | Performed by: FAMILY MEDICINE

## 2021-06-10 PROCEDURE — 83036 HEMOGLOBIN GLYCOSYLATED A1C: CPT | Performed by: FAMILY MEDICINE

## 2021-06-10 PROCEDURE — 3075F SYST BP GE 130 - 139MM HG: CPT | Performed by: FAMILY MEDICINE

## 2021-06-10 PROCEDURE — 3079F DIAST BP 80-89 MM HG: CPT | Performed by: FAMILY MEDICINE

## 2021-06-10 PROCEDURE — 99213 OFFICE O/P EST LOW 20 MIN: CPT | Performed by: FAMILY MEDICINE

## 2021-06-10 NOTE — PATIENT INSTRUCTIONS
Continue with wound care. Schedule appointment with surgeon. Check A1c today.    Advice low carb in diet, AVOID FOOD WITH HIGH GLYCEMIC INDEX, have smaller portion meal, avoid bread, pasta and potatoes, no juice or soda, don't eat late at night, exerc

## 2021-06-10 NOTE — PROGRESS NOTES
[address]  PROGRESS NOTE  Chief Complaint:   Patient presents with:  Wound Care: patient has wound on left arm that is not healing. Patient got bit by a spider ahwhile ago and now it has gotten worse.        HPI:   This is a 55year old Father         colon   • Diabetes Father    • Heart Disorder Father         CABG, stents   • Hypertension Father    • Psychiatric Father    • Alcohol and Other Disorders Associated Father    • Neurological Disorder Father         Parkinsons   • Heart Attac sputum. GASTROINTESTINAL:  Denies abdominal pain, nausea, vomiting, constipation, diarrhea, or blood in stool. MUSCULOSKELETAL:  Denies weakness, muscle aches, back pain, joint pain, swelling or stiffness.   HEMATOLOGIC:  Denies anemia, bleeding or bruisi bread, pasta and potatoes, no juice or soda, don't eat late at night, exercise,  and weight loss. Health Maintenance:  COVID-19 Vaccine(1) Never done    Patient/Caregiver Education: Patient/Caregiver Education: There are no barriers to learning.  Me

## 2021-06-15 ENCOUNTER — OFFICE VISIT (OUTPATIENT)
Dept: SURGERY | Facility: CLINIC | Age: 46
End: 2021-06-15

## 2021-06-15 VITALS — HEIGHT: 69 IN | TEMPERATURE: 99 F | WEIGHT: 219 LBS | HEART RATE: 96 BPM | BODY MASS INDEX: 32.44 KG/M2

## 2021-06-15 DIAGNOSIS — L98.491 SKIN ULCER OF UPPER ARM, LIMITED TO BREAKDOWN OF SKIN (HCC): Primary | ICD-10-CM

## 2021-06-15 PROCEDURE — 3008F BODY MASS INDEX DOCD: CPT | Performed by: SURGERY

## 2021-06-15 NOTE — H&P
Mundo Perdomo is a 54year old male  Patient presents with:  Consult: New patient referred by Dr. Praveena Lopez for left forearm wound. c/o drainage or soreness to the area. denies any fever or chills.        REFERRED BY    Patient presents with  Chronic w Particles, Take 1 capsule (30 mg) by mouth every afternoon. , Disp: 90 capsule, Rfl: 0  Multiple Vitamins-Minerals (CEROVITE SENIOR) Oral Tab, Take 1 tablet by mouth daily. , Disp: 30 tablet, Rfl: 0  Cholecalciferol (VITAMIN D3) 250 MCG (44474 UT) Oral Cap, Pulse 96, temperature 98.6 °F (37 °C), temperature source Temporal, height 69\", weight 219 lb (99.3 kg). GENERAL:.    MENTAL STATUS :Alert, oriented x 3  PSYCH: Patient is aggressive and demanding.   SKIN: anicteric, no rashes, no bruising  EYES: PERR aggressive angry was making profane statements to myself and to the staff. He asked for a Band-Aid which we placed on his wound which he immediately ripped off and again aggressively scraped inside of the wound with his shirt. Causing it to bleed.   Mayi

## 2021-06-23 ENCOUNTER — PATIENT MESSAGE (OUTPATIENT)
Dept: FAMILY MEDICINE CLINIC | Facility: CLINIC | Age: 46
End: 2021-06-23

## 2021-06-23 DIAGNOSIS — T14.8XXA OPEN WOUND: Primary | ICD-10-CM

## 2021-06-23 NOTE — TELEPHONE ENCOUNTER
From: Alisa Mccormick  To: Ollie Montesinos MD  Sent: 6/23/2021 4:13 PM CDT  Subject: Referral Request    Hello,  I recently saw you regarding an open wound on my arm. You referred me to a surgeon, however the surgeon said there was nothing he could do.

## 2021-07-22 PROCEDURE — 88305 TISSUE EXAM BY PATHOLOGIST: CPT | Performed by: INTERNAL MEDICINE

## 2021-07-24 ENCOUNTER — LAB REQUISITION (OUTPATIENT)
Dept: LAB | Facility: HOSPITAL | Age: 46
End: 2021-07-24
Payer: COMMERCIAL

## 2021-07-24 DIAGNOSIS — D48.9 NEOPLASM OF UNCERTAIN BEHAVIOR, UNSPECIFIED: ICD-10-CM

## 2021-08-16 RX ORDER — DULOXETIN HYDROCHLORIDE 60 MG/1
CAPSULE, DELAYED RELEASE ORAL
Qty: 180 CAPSULE | Refills: 0 | OUTPATIENT
Start: 2021-08-16

## 2021-10-18 ENCOUNTER — HOSPITAL ENCOUNTER (OUTPATIENT)
Dept: GENERAL RADIOLOGY | Facility: HOSPITAL | Age: 46
Discharge: HOME | End: 2021-10-18
Attending: ORTHOPAEDIC SURGERY
Payer: COMMERCIAL

## 2021-10-18 ENCOUNTER — OFFICE VISIT (OUTPATIENT)
Dept: ORTHOPEDICS CLINIC | Facility: CLINIC | Age: 46
End: 2021-10-18
Payer: COMMERCIAL

## 2021-10-18 VITALS — WEIGHT: 238 LBS | BODY MASS INDEX: 35.25 KG/M2 | HEIGHT: 69 IN

## 2021-10-18 DIAGNOSIS — M17.31 POST-TRAUMATIC OSTEOARTHRITIS OF RIGHT KNEE: Primary | ICD-10-CM

## 2021-10-18 DIAGNOSIS — Z47.89 ORTHOPEDIC AFTERCARE: ICD-10-CM

## 2021-10-18 DIAGNOSIS — Z96.651 S/P TOTAL KNEE REPLACEMENT USING CEMENT, RIGHT: ICD-10-CM

## 2021-10-18 PROCEDURE — 73562 X-RAY EXAM OF KNEE 3: CPT | Performed by: ORTHOPAEDIC SURGERY

## 2021-10-18 PROCEDURE — 3008F BODY MASS INDEX DOCD: CPT | Performed by: PHYSICIAN ASSISTANT

## 2021-10-18 PROCEDURE — 99213 OFFICE O/P EST LOW 20 MIN: CPT | Performed by: PHYSICIAN ASSISTANT

## 2021-10-18 NOTE — PROGRESS NOTES
NURSING INTAKE COMMENTS: Patient presents with: Follow - Up: 1 yr s/p right knee replacement- pt rates pain 2-3/10       HPI: This 51year old male presents today for 1 year follow-up after right total knee arthroplasty.   He is overall very happy with his Disorders Associated Father    • Neurological Disorder Father         Parkinsons   • Heart Attack Father         x 4; first one was in his 46s   • Heart Disorder Mother         a fib   • Hypertension Mother    • Anxiety Mother         H/o   • Other (Spine issues  ALL/ASTHMA: no new hx of severe allergy or asthma    Physical Examination:    Ht 5' 9\" (1.753 m)   Wt 238 lb (108 kg)   BMI 35.15 kg/m²   Constitutional: appears well hydrated, alert and responsive, no acute distress noted  Extremities: Incision i

## 2022-02-09 ENCOUNTER — OFFICE VISIT (OUTPATIENT)
Dept: FAMILY MEDICINE CLINIC | Facility: CLINIC | Age: 47
End: 2022-02-09
Payer: COMMERCIAL

## 2022-02-09 VITALS
OXYGEN SATURATION: 96 % | SYSTOLIC BLOOD PRESSURE: 124 MMHG | TEMPERATURE: 97 F | RESPIRATION RATE: 18 BRPM | WEIGHT: 231.81 LBS | HEART RATE: 70 BPM | DIASTOLIC BLOOD PRESSURE: 68 MMHG | BODY MASS INDEX: 34.33 KG/M2 | HEIGHT: 69 IN

## 2022-02-09 DIAGNOSIS — R73.9 HYPERGLYCEMIA: ICD-10-CM

## 2022-02-09 DIAGNOSIS — G56.03 BILATERAL CARPAL TUNNEL SYNDROME: Primary | ICD-10-CM

## 2022-02-09 DIAGNOSIS — I10 ESSENTIAL HYPERTENSION: ICD-10-CM

## 2022-02-09 PROCEDURE — 99213 OFFICE O/P EST LOW 20 MIN: CPT | Performed by: FAMILY MEDICINE

## 2022-02-09 PROCEDURE — 3078F DIAST BP <80 MM HG: CPT | Performed by: FAMILY MEDICINE

## 2022-02-09 PROCEDURE — 3074F SYST BP LT 130 MM HG: CPT | Performed by: FAMILY MEDICINE

## 2022-02-09 PROCEDURE — 3008F BODY MASS INDEX DOCD: CPT | Performed by: FAMILY MEDICINE

## 2022-02-09 RX ORDER — DULOXETIN HYDROCHLORIDE 20 MG/1
2 CAPSULE, DELAYED RELEASE ORAL DAILY
COMMUNITY
Start: 2022-02-02

## 2022-02-09 RX ORDER — DULOXETIN HYDROCHLORIDE 60 MG/1
60 CAPSULE, DELAYED RELEASE ORAL DAILY
COMMUNITY
Start: 2022-02-02

## 2022-02-09 NOTE — PATIENT INSTRUCTIONS
Continue current medications  See orthopedics for evaluation of possible surgery. Consider wrist carpal tunnel brace. Aleve as needed       Advice low cholesterol diet and exercise. May use fish oil supplement. Advice low carb in diet, AVOID FOOD WITH HIGH GLYCEMIC INDEX, have smaller portion meal, avoid bread, pasta and potatoes, no juice or soda, don't eat late at night, exercise,  and weight loss.

## 2022-04-20 ENCOUNTER — TELEPHONE (OUTPATIENT)
Dept: FAMILY MEDICINE CLINIC | Facility: CLINIC | Age: 47
End: 2022-04-20

## 2022-04-20 RX ORDER — ATORVASTATIN CALCIUM 10 MG/1
10 TABLET, FILM COATED ORAL NIGHTLY
Qty: 90 TABLET | Refills: 1 | Status: SHIPPED | OUTPATIENT
Start: 2022-04-20

## 2022-04-20 NOTE — TELEPHONE ENCOUNTER
Future appt: Your appointments     Date & Time Appointment Department [facility])    Oct 18, 2022 10:00 AM CDT Exam - Established with Imelda Edwards MD [facility], [address], [place] ([facility])            [facility], [address], [address] [place] [address]         Last Appointment with provider:   2/9/2022- advised Return in about 3 months (around 5/9/2022) for physical.        Cholesterol, Total (mg/dL)   Date Value   04/21/2021 171     HDL Cholesterol (mg/dL)   Date Value   04/21/2021 40     LDL Cholesterol (mg/dL)   Date Value   04/21/2021 103 (H)     Triglycerides (mg/dL)   Date Value   04/21/2021 140     Lab Results   Component Value Date     04/21/2021    A1C 5.7 (A) 06/10/2021     Lab Results   Component Value Date    T4F 0.9 09/04/2020    TSH 1.740 04/21/2021       No follow-ups on file.
pt needs refill for cholersterol medication, spouse not sure of name, please send to [facility]
Psych/Behavioral

## 2022-11-17 DIAGNOSIS — E78.00 HYPERCHOLESTEROLEMIA: ICD-10-CM

## 2022-11-17 NOTE — TELEPHONE ENCOUNTER
Pt due for PX, MCMS    Future appt:    Last Appointment with provider:   2/9/22(pain)-f/u 3mo(5/9/22)  Last appointment at [facility]:  Visit date not found    atorvastatin 10 MG Oral Tab    90tab  1refill        Filled:4/20/22 Summary: Take 1 tablet (10 mg total) by mouth nightly          Cholesterol, Total (mg/dL)   Date Value   04/21/2021 171     HDL Cholesterol (mg/dL)   Date Value   04/21/2021 40     LDL Cholesterol (mg/dL)   Date Value   04/21/2021 103 (H)     Triglycerides (mg/dL)   Date Value   04/21/2021 140     Lab Results   Component Value Date     04/21/2021    A1C 5.7 (A) 06/10/2021     Lab Results   Component Value Date    T4F 0.9 09/04/2020    TSH 1.740 04/21/2021       No follow-ups on file.

## 2022-11-18 ENCOUNTER — TELEPHONE (OUTPATIENT)
Dept: FAMILY MEDICINE CLINIC | Facility: CLINIC | Age: 47
End: 2022-11-18

## 2022-11-18 NOTE — TELEPHONE ENCOUNTER
Pt's wife calling to make appt for Pt. Stated that he could not get his prescription refilled for atorvastatin until appt was made for physical.  Needs refill.      Your appointments     Date & Time Appointment Department [facility])    Dec 07, 2022  9:30 AM CST Physical - Established with Jeremy Gonzales MD [address] [place] [address] ([facility])            [address] [place] [address] [facility] [id number] [phone]        Please advise

## 2022-11-18 NOTE — TELEPHONE ENCOUNTER
Future Appointments   Date Time Provider Gregor Burgos   12/7/2022  9:30 AM Johnson Lara MD [facility]

## 2022-11-20 RX ORDER — ATORVASTATIN CALCIUM 10 MG/1
10 TABLET, FILM COATED ORAL NIGHTLY
Qty: 90 TABLET | Refills: 0 | Status: SHIPPED | OUTPATIENT
Start: 2022-11-20

## 2022-12-07 ENCOUNTER — OFFICE VISIT (OUTPATIENT)
Dept: FAMILY MEDICINE CLINIC | Facility: CLINIC | Age: 47
End: 2022-12-07
Payer: COMMERCIAL

## 2022-12-07 VITALS
WEIGHT: 223 LBS | HEART RATE: 99 BPM | OXYGEN SATURATION: 98 % | SYSTOLIC BLOOD PRESSURE: 110 MMHG | DIASTOLIC BLOOD PRESSURE: 68 MMHG | TEMPERATURE: 98 F | BODY MASS INDEX: 33.03 KG/M2 | RESPIRATION RATE: 16 BRPM | HEIGHT: 68.75 IN

## 2022-12-07 DIAGNOSIS — E78.00 HYPERCHOLESTEROLEMIA: ICD-10-CM

## 2022-12-07 DIAGNOSIS — Z00.00 PHYSICAL EXAM: Primary | ICD-10-CM

## 2022-12-07 DIAGNOSIS — F43.10 POST TRAUMATIC STRESS DISORDER: ICD-10-CM

## 2022-12-07 DIAGNOSIS — Z12.11 COLON CANCER SCREENING: ICD-10-CM

## 2022-12-07 DIAGNOSIS — R73.9 HYPERGLYCEMIA: ICD-10-CM

## 2022-12-07 DIAGNOSIS — I10 ESSENTIAL HYPERTENSION: ICD-10-CM

## 2022-12-07 PROCEDURE — 99396 PREV VISIT EST AGE 40-64: CPT | Performed by: FAMILY MEDICINE

## 2022-12-07 PROCEDURE — 3078F DIAST BP <80 MM HG: CPT | Performed by: FAMILY MEDICINE

## 2022-12-07 PROCEDURE — 3008F BODY MASS INDEX DOCD: CPT | Performed by: FAMILY MEDICINE

## 2022-12-07 PROCEDURE — 3074F SYST BP LT 130 MM HG: CPT | Performed by: FAMILY MEDICINE

## 2022-12-07 RX ORDER — CHLORAL HYDRATE 500 MG
1000 CAPSULE ORAL DAILY
COMMUNITY

## 2022-12-07 NOTE — PATIENT INSTRUCTIONS
Continue current medications   Blood pressure stable today. Advice low salt diet and exercise. Monitor your blood pressure. Return to clinic if systolic blood pressure more than 124 or diastolic more than 203. Advice low carb in diet, AVOID FOOD WITH HIGH GLYCEMIC INDEX, have smaller portion meal, avoid bread, pasta and potatoes, no juice or soda, don't eat late at night, exercise,  and weight loss. Advice low cholesterol diet and exercise. May use fish oil supplement. Recommend colonoscopy or FIT test.  Recommend flu shot. Schedule fasting labs.

## 2022-12-10 ENCOUNTER — LABORATORY ENCOUNTER (OUTPATIENT)
Dept: LAB | Age: 47
End: 2022-12-10
Attending: FAMILY MEDICINE
Payer: COMMERCIAL

## 2022-12-10 DIAGNOSIS — Z00.00 PHYSICAL EXAM: ICD-10-CM

## 2022-12-10 LAB
ALBUMIN SERPL-MCNC: 3.9 G/DL (ref 3.4–5)
ALBUMIN/GLOB SERPL: 1.3 {RATIO} (ref 1–2)
ALP LIVER SERPL-CCNC: 65 U/L
ALT SERPL-CCNC: 37 U/L
ANION GAP SERPL CALC-SCNC: 4 MMOL/L (ref 0–18)
AST SERPL-CCNC: 15 U/L (ref 15–37)
BASOPHILS # BLD AUTO: 0.1 X10(3) UL (ref 0–0.2)
BASOPHILS NFR BLD AUTO: 1.2 %
BILIRUB SERPL-MCNC: 0.7 MG/DL (ref 0.1–2)
BILIRUB UR QL CFM: NEGATIVE
BUN BLD-MCNC: 13 MG/DL (ref 7–18)
CALCIUM BLD-MCNC: 9 MG/DL (ref 8.5–10.1)
CHLORIDE SERPL-SCNC: 108 MMOL/L (ref 98–112)
CHOLEST SERPL-MCNC: 175 MG/DL (ref ?–200)
CLARITY UR REFRACT.AUTO: CLEAR
CO2 SERPL-SCNC: 26 MMOL/L (ref 21–32)
COLOR UR AUTO: YELLOW
CREAT BLD-MCNC: 0.78 MG/DL
EOSINOPHIL # BLD AUTO: 0.19 X10(3) UL (ref 0–0.7)
EOSINOPHIL NFR BLD AUTO: 2.3 %
ERYTHROCYTE [DISTWIDTH] IN BLOOD BY AUTOMATED COUNT: 12.6 %
EST. AVERAGE GLUCOSE BLD GHB EST-MCNC: 126 MG/DL (ref 68–126)
FASTING PATIENT LIPID ANSWER: YES
FASTING STATUS PATIENT QL REPORTED: YES
GFR SERPLBLD BASED ON 1.73 SQ M-ARVRAT: 110 ML/MIN/1.73M2 (ref 60–?)
GLOBULIN PLAS-MCNC: 3 G/DL (ref 2.8–4.4)
GLUCOSE BLD-MCNC: 99 MG/DL (ref 70–99)
GLUCOSE UR STRIP.AUTO-MCNC: NEGATIVE MG/DL
HBA1C MFR BLD: 6 % (ref ?–5.7)
HCT VFR BLD AUTO: 45.1 %
HDLC SERPL-MCNC: 45 MG/DL (ref 40–59)
HGB BLD-MCNC: 15.2 G/DL
IMM GRANULOCYTES # BLD AUTO: 0.03 X10(3) UL (ref 0–1)
IMM GRANULOCYTES NFR BLD: 0.4 %
KETONES UR STRIP.AUTO-MCNC: NEGATIVE MG/DL
LDLC SERPL CALC-MCNC: 110 MG/DL (ref ?–100)
LEUKOCYTE ESTERASE UR QL STRIP.AUTO: NEGATIVE
LYMPHOCYTES # BLD AUTO: 2.38 X10(3) UL (ref 1–4)
LYMPHOCYTES NFR BLD AUTO: 29.4 %
MCH RBC QN AUTO: 29.1 PG (ref 26–34)
MCHC RBC AUTO-ENTMCNC: 33.7 G/DL (ref 31–37)
MCV RBC AUTO: 86.4 FL
MONOCYTES # BLD AUTO: 0.82 X10(3) UL (ref 0.1–1)
MONOCYTES NFR BLD AUTO: 10.1 %
NEUTROPHILS # BLD AUTO: 4.57 X10 (3) UL (ref 1.5–7.7)
NEUTROPHILS # BLD AUTO: 4.57 X10(3) UL (ref 1.5–7.7)
NEUTROPHILS NFR BLD AUTO: 56.6 %
NITRITE UR QL STRIP.AUTO: NEGATIVE
NONHDLC SERPL-MCNC: 130 MG/DL (ref ?–130)
OSMOLALITY SERPL CALC.SUM OF ELEC: 286 MOSM/KG (ref 275–295)
PH UR STRIP.AUTO: 6 [PH] (ref 5–8)
PLATELET # BLD AUTO: 298 10(3)UL (ref 150–450)
POTASSIUM SERPL-SCNC: 3.9 MMOL/L (ref 3.5–5.1)
PROT SERPL-MCNC: 6.9 G/DL (ref 6.4–8.2)
PROT UR STRIP.AUTO-MCNC: NEGATIVE MG/DL
RBC # BLD AUTO: 5.22 X10(6)UL
SODIUM SERPL-SCNC: 138 MMOL/L (ref 136–145)
SP GR UR STRIP.AUTO: 1.02 (ref 1–1.03)
T3FREE SERPL-MCNC: 2.93 PG/ML (ref 2.4–4.2)
T4 FREE SERPL-MCNC: 1 NG/DL (ref 0.8–1.7)
TRIGL SERPL-MCNC: 108 MG/DL (ref 30–149)
TSI SER-ACNC: 0.3 MIU/ML (ref 0.36–3.74)
UROBILINOGEN UR STRIP.AUTO-MCNC: 0.2 MG/DL
VLDLC SERPL CALC-MCNC: 18 MG/DL (ref 0–30)
WBC # BLD AUTO: 8.1 X10(3) UL (ref 4–11)

## 2022-12-10 PROCEDURE — 84439 ASSAY OF FREE THYROXINE: CPT

## 2022-12-10 PROCEDURE — 80061 LIPID PANEL: CPT

## 2022-12-10 PROCEDURE — 81001 URINALYSIS AUTO W/SCOPE: CPT

## 2022-12-10 PROCEDURE — 83036 HEMOGLOBIN GLYCOSYLATED A1C: CPT

## 2022-12-10 PROCEDURE — 85025 COMPLETE CBC W/AUTO DIFF WBC: CPT

## 2022-12-10 PROCEDURE — 84443 ASSAY THYROID STIM HORMONE: CPT

## 2022-12-10 PROCEDURE — 81015 MICROSCOPIC EXAM OF URINE: CPT

## 2022-12-10 PROCEDURE — 36415 COLL VENOUS BLD VENIPUNCTURE: CPT

## 2022-12-10 PROCEDURE — 84481 FREE ASSAY (FT-3): CPT

## 2022-12-10 PROCEDURE — 80053 COMPREHEN METABOLIC PANEL: CPT

## 2022-12-20 ENCOUNTER — OFFICE VISIT (OUTPATIENT)
Dept: FAMILY MEDICINE CLINIC | Facility: CLINIC | Age: 47
End: 2022-12-20
Payer: COMMERCIAL

## 2022-12-20 VITALS
DIASTOLIC BLOOD PRESSURE: 78 MMHG | SYSTOLIC BLOOD PRESSURE: 112 MMHG | TEMPERATURE: 98 F | WEIGHT: 218 LBS | HEART RATE: 91 BPM | HEIGHT: 68.75 IN | OXYGEN SATURATION: 97 % | BODY MASS INDEX: 32.29 KG/M2 | RESPIRATION RATE: 16 BRPM

## 2022-12-20 DIAGNOSIS — Z86.16 HISTORY OF COVID-19: ICD-10-CM

## 2022-12-20 DIAGNOSIS — Z00.8 ENCOUNTER FOR WORK CAPABILITY ASSESSMENT: Primary | ICD-10-CM

## 2022-12-20 PROCEDURE — 3078F DIAST BP <80 MM HG: CPT

## 2022-12-20 PROCEDURE — 3074F SYST BP LT 130 MM HG: CPT

## 2022-12-20 PROCEDURE — 3008F BODY MASS INDEX DOCD: CPT

## 2022-12-20 PROCEDURE — 99213 OFFICE O/P EST LOW 20 MIN: CPT

## 2022-12-20 RX ORDER — ASCORBIC ACID 1000 MG
TABLET ORAL
COMMUNITY

## 2022-12-28 ENCOUNTER — TELEPHONE (OUTPATIENT)
Dept: FAMILY MEDICINE CLINIC | Facility: CLINIC | Age: 47
End: 2022-12-28

## 2022-12-28 NOTE — TELEPHONE ENCOUNTER
Received paperwork for disability benefits for pt. At 12/20 visit with Albertine Galeazzi, APRN, note states that they were waiting for paperwork to come through. Left detailed message for pt informing him that paper work has been received and Urbano Kaufman will not be in the office until next week for it to be completed. Informed pt to call and schedule an appointment with any other provider if paperwork is needed to be completed sooner. Paperwork to be set on [employer].

## 2023-01-04 NOTE — TELEPHONE ENCOUNTER
Left detailed message for pt explaining that paper work has been faxed. Pt informed to call back with any questions or concerns.

## 2023-01-05 ENCOUNTER — TELEPHONE (OUTPATIENT)
Dept: FAMILY MEDICINE CLINIC | Facility: CLINIC | Age: 48
End: 2023-01-05

## 2023-02-25 DIAGNOSIS — E78.00 HYPERCHOLESTEROLEMIA: ICD-10-CM

## 2023-02-27 RX ORDER — ATORVASTATIN CALCIUM 10 MG/1
10 TABLET, FILM COATED ORAL NIGHTLY
Qty: 90 TABLET | Refills: 1 | Status: SHIPPED | OUTPATIENT
Start: 2023-02-27

## 2023-02-27 NOTE — TELEPHONE ENCOUNTER
Future appt:    Last Appointment with provider:   12/20/2022 for a work physical with MARIE Gotti; No f/u recommended    Last appointment at [facility]:  12/20/2022  Cholesterol, Total (mg/dL)   Date Value   12/10/2022 175     HDL Cholesterol (mg/dL)   Date Value   12/10/2022 45     LDL Cholesterol (mg/dL)   Date Value   12/10/2022 110 (H)     Triglycerides (mg/dL)   Date Value   12/10/2022 108     Lab Results   Component Value Date     12/10/2022    A1C 6.0 (H) 12/10/2022     Lab Results   Component Value Date    T4F 1.0 12/10/2022    TSH 0.303 (L) 12/10/2022     Last RF:  11/20/2022    No follow-ups on file.

## 2023-09-22 DIAGNOSIS — E78.00 HYPERCHOLESTEROLEMIA: ICD-10-CM

## 2023-09-25 RX ORDER — ATORVASTATIN CALCIUM 10 MG/1
10 TABLET, FILM COATED ORAL NIGHTLY
Qty: 90 TABLET | Refills: 1 | Status: SHIPPED | OUTPATIENT
Start: 2023-09-25

## 2023-09-25 NOTE — TELEPHONE ENCOUNTER
Last Refill: 02/27/2023 #90 with 1 Refill   Last Px: 12/07/2022     Return in about 6 months (around 6/7/2023) for follow up. No future appointments.

## (undated) DIAGNOSIS — E78.00 HYPERCHOLESTEROLEMIA: ICD-10-CM

## (undated) DEVICE — GAUZE SPONGES,12 PLY: Brand: CURITY

## (undated) DEVICE — GOWN SURG AERO BLUE PERF XLG

## (undated) DEVICE — DRAPE SHEET LG

## (undated) DEVICE — SPINOCAN® 18 GA. X 3-1/2 IN. (90 MM) SPINAL NEEDLE: Brand: SPINOCAN®

## (undated) DEVICE — SYRINGE MNJCT 35ML LF STRL LL

## (undated) DEVICE — DISPOSABLE DRILL/PIN SET

## (undated) DEVICE — Device

## (undated) DEVICE — TRAY SKIN PREP PVP-1

## (undated) DEVICE — SOL  .9 1000ML BTL

## (undated) DEVICE — SUTURE VICRYL 2-0 FS-1

## (undated) DEVICE — TOTAL KNEE: Brand: MEDLINE INDUSTRIES, INC.

## (undated) DEVICE — PIN HEADED 48MM

## (undated) DEVICE — CEMENT MIXING SYSTEM WITH FEMORAL BREAKWAY NOZZLE: Brand: REVOLUTION

## (undated) DEVICE — BATTERY

## (undated) DEVICE — GAMMEX® NON-LATEX PI ORTHO SIZE 8, STERILE POLYISOPRENE POWDER-FREE SURGICAL GLOVE: Brand: GAMMEX

## (undated) DEVICE — VIOLET BRAIDED (POLYGLACTIN 910), SYNTHETIC ABSORBABLE SUTURE: Brand: COATED VICRYL

## (undated) DEVICE — COTTON ROLL: Brand: DEROYAL

## (undated) DEVICE — ENCORE® LATEX ACCLAIM SIZE 7.5, STERILE LATEX POWDER-FREE SURGICAL GLOVE: Brand: ENCORE

## (undated) DEVICE — TOURNIQUET CUFF 34 STR

## (undated) DEVICE — Device: Brand: STABLECUT®

## (undated) DEVICE — GAMMEX® NON-LATEX PI ORTHO SIZE 7.5, STERILE POLYISOPRENE POWDER-FREE SURGICAL GLOVE: Brand: GAMMEX

## (undated) DEVICE — SHEET,DRAPE,70X100,STERILE: Brand: MEDLINE

## (undated) DEVICE — WRAP COOLING KNEE W/ICE PILLOW

## (undated) DEVICE — SUTURE MONOCRYL 3-0 Y936H

## (undated) DEVICE — HOOD: Brand: FLYTE

## (undated) DEVICE — SOL  .9 3000ML

## (undated) DEVICE — BANDAGE FLXMSTR 11YDX6IN STRL

## (undated) DEVICE — DRESSING 10X4IN ANMC SAFETAC

## (undated) DEVICE — PIN HEX FEMALE 25MM

## (undated) DEVICE — DERMABOND LIQUID ADHESIVE

## (undated) NOTE — MR AVS SNAPSHOT
After Visit Summary   9/24/2019    Fuad Bergeron    MRN: [id number]           Visit Information     Date & Time  9/24/2019  3:54 PM Provider  Sultana Solorzano MD [facility] Dept.  Phone  [phone] complete it and provide feedback. We strive to deliver the best patient experience and are looking for ways to make improvements. Your feedback will help us do so. For more information on [employer], please visit [email or web address]

## (undated) NOTE — LETTER
Date: 12/20/2022    Patient Name: Medhat Curtis          To Whom it may concern: This letter has been written at the patient's request. The above patient was seen at the [facility] for treatment of a medical condition. The patient may return to work on 12/20/2022 without limitations. Sincerely,  . Hua Garcia Fat

## (undated) NOTE — MR AVS SNAPSHOT
[address],  [address]  [phone]               Thank you for choosing us for your health care visit with Tiera Garibay MD.  We are glad to serve you and happy to provide you with this summary of yo If you have questions, you can call [phone] to talk to our [facility] Staff. Remember, [employer] is NOT to be used for urgent needs. For medical emergencies, dial 911.         Educational Information     Healthy Diet and Regular Exercise  [facility]

## (undated) NOTE — MR AVS SNAPSHOT
After Visit Summary   1/16/2017    Fabi Mora    MRN: [id number]           Visit Information        Provider Department Dept Phone    1/16/2017  8:00 PM Bed1  [facility] [phone]      Allergies as of 1/16/2017  Reviewed on: 11/1/20

## (undated) NOTE — MR AVS SNAPSHOT
[facility], [address] [place] [address] [phone]               Thank you for choosing us for your health care visit with Magali Ybarra DO.   We are glad to serve you and happy to provide you with this summary Results of Recent Testing     URINALYSIS, AUTO, W/O SCOPE      Component Value Standard Range & Units    GLUCOSE (URINE DIPSTICK) 0 Negative mg/dL    BILIRUBIN 0 Negative    KETONES (URINE DIPSTICK) 0 Negative mg/dL    SPECIFIC GRAVITY 1.015 1.005 - 1.030 Aerobic physical activity Regular aerobic physical activity (e.g., brisk walking, light jogging, cycling, swimming, etc.) for a goal of at least 150 minutes per week. Moderation of alcohol consumption Men: limit to <= 2 drinks* per day.   Women and lighte